# Patient Record
Sex: FEMALE | Race: WHITE | NOT HISPANIC OR LATINO | Employment: FULL TIME | URBAN - NONMETROPOLITAN AREA
[De-identification: names, ages, dates, MRNs, and addresses within clinical notes are randomized per-mention and may not be internally consistent; named-entity substitution may affect disease eponyms.]

---

## 2017-03-27 DIAGNOSIS — I10 ESSENTIAL HYPERTENSION, BENIGN: ICD-10-CM

## 2017-03-29 RX ORDER — LOSARTAN POTASSIUM 50 MG/1
50 TABLET ORAL DAILY
Qty: 90 TAB | Refills: 2 | Status: SHIPPED | OUTPATIENT
Start: 2017-03-29 | End: 2017-04-03 | Stop reason: SDUPTHER

## 2017-04-03 DIAGNOSIS — I10 ESSENTIAL HYPERTENSION, BENIGN: ICD-10-CM

## 2017-04-03 RX ORDER — LOSARTAN POTASSIUM 50 MG/1
50 TABLET ORAL DAILY
Qty: 90 TAB | Refills: 3 | Status: SHIPPED | OUTPATIENT
Start: 2017-04-03 | End: 2017-12-07 | Stop reason: SDUPTHER

## 2017-04-17 DIAGNOSIS — I71.40 AAA (ABDOMINAL AORTIC ANEURYSM) WITHOUT RUPTURE (HCC): ICD-10-CM

## 2017-04-17 DIAGNOSIS — E78.2 MIXED HYPERLIPIDEMIA: ICD-10-CM

## 2017-04-17 DIAGNOSIS — I25.10 CORONARY ARTERY DISEASE INVOLVING NATIVE CORONARY ARTERY OF NATIVE HEART WITHOUT ANGINA PECTORIS: ICD-10-CM

## 2017-05-02 ENCOUNTER — OFFICE VISIT (OUTPATIENT)
Dept: URGENT CARE | Facility: PHYSICIAN GROUP | Age: 67
End: 2017-05-02
Payer: COMMERCIAL

## 2017-05-02 VITALS
WEIGHT: 144 LBS | SYSTOLIC BLOOD PRESSURE: 120 MMHG | BODY MASS INDEX: 23.96 KG/M2 | TEMPERATURE: 98.8 F | HEART RATE: 92 BPM | RESPIRATION RATE: 18 BRPM | OXYGEN SATURATION: 96 % | DIASTOLIC BLOOD PRESSURE: 70 MMHG

## 2017-05-02 DIAGNOSIS — J40 BRONCHITIS: ICD-10-CM

## 2017-05-02 PROCEDURE — 94640 AIRWAY INHALATION TREATMENT: CPT | Performed by: PHYSICIAN ASSISTANT

## 2017-05-02 PROCEDURE — 99214 OFFICE O/P EST MOD 30 MIN: CPT | Mod: 25 | Performed by: PHYSICIAN ASSISTANT

## 2017-05-02 RX ORDER — AZITHROMYCIN 250 MG/1
TABLET, FILM COATED ORAL
Qty: 6 TAB | Refills: 0 | Status: SHIPPED | OUTPATIENT
Start: 2017-05-02 | End: 2017-07-11

## 2017-05-02 RX ORDER — ALBUTEROL SULFATE 90 UG/1
2 AEROSOL, METERED RESPIRATORY (INHALATION) EVERY 6 HOURS PRN
Qty: 8.5 G | Refills: 1 | Status: SHIPPED | OUTPATIENT
Start: 2017-05-02 | End: 2017-07-11

## 2017-05-02 RX ORDER — IPRATROPIUM BROMIDE AND ALBUTEROL SULFATE 2.5; .5 MG/3ML; MG/3ML
3 SOLUTION RESPIRATORY (INHALATION) ONCE
Status: COMPLETED | OUTPATIENT
Start: 2017-05-02 | End: 2017-05-02

## 2017-05-02 RX ORDER — CODEINE PHOSPHATE AND GUAIFENESIN 10; 100 MG/5ML; MG/5ML
5 SOLUTION ORAL EVERY 6 HOURS PRN
Qty: 100 ML | Refills: 0 | Status: SHIPPED | OUTPATIENT
Start: 2017-05-02 | End: 2017-07-11

## 2017-05-02 RX ORDER — BENZONATATE 200 MG/1
200 CAPSULE ORAL 3 TIMES DAILY PRN
Qty: 30 CAP | Refills: 0 | Status: SHIPPED | OUTPATIENT
Start: 2017-05-02 | End: 2017-07-11

## 2017-05-02 RX ADMIN — IPRATROPIUM BROMIDE AND ALBUTEROL SULFATE 3 ML: 2.5; .5 SOLUTION RESPIRATORY (INHALATION) at 10:56

## 2017-05-02 ASSESSMENT — ENCOUNTER SYMPTOMS
FEVER: 0
COUGH: 1
SPUTUM PRODUCTION: 1
CHILLS: 0
WHEEZING: 1
RHINORRHEA: 1
SINUS PAIN: 0
SHORTNESS OF BREATH: 1
MYALGIAS: 0

## 2017-05-02 NOTE — MR AVS SNAPSHOT
Jailyn Griffiths   2017 9:35 AM   Office Visit   MRN: [id number]    Department:  [facility]   Dept Phone:  [phone]    Description:  Female : 1950   Provider:  ERIN Ch           Reason for Visit     URI Cold Sx, x4 days      Allergies as of 2017     Allergen Noted Reactions    Nkda [No Known Drug Allergy] 2010         You were diagnosed with     Bronchitis   [128785]   Fluctuating for 4 days. Coarse breath sounds initially, improved with DuoNeb. Start albuterol, Tessalon, Robitussin-AC. Contingent azithromycin      Vital Signs     Blood Pressure Pulse Temperature Respirations Weight Oxygen Saturation    120/70 mmHg 92 37.1 °C (98.8 °F) 18 65.318 kg (144 lb) 96%    Smoking Status                   Former Smoker           Basic Information     Date Of Birth Sex Race Ethnicity Preferred Language    1950 Female White Non- English      Your appointments     2017  3:20 PM   FOLLOW UP with Shiv Black M.D.   [facility] ([facility]    [address] [place] [id number] [phone]            Aug 28, 2017  3:20 PM   ANNUAL EXAM PRE[facility] [address] [address] [phone]              Problem List              ICD-10-CM Priority Class Noted - Resolved    CAD (coronary artery disease), native coronary artery I25.10 Medium  2010 - Present    Varicose veins of leg with complications I83.899   2010 - Present    Sleep related leg cramps G47.62   2010 - Present    AAA (abdominal aortic aneurysm) without rupture (CMS-HCC) I71.4   2010 - Present    Nummular eczema L30.0   2011 - Present    Apparent CHF (congestive heart failure) provoked by celiac disease now resolved.  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   6/28/2012 - Present    Celiac disease K90.0   8/2/2012 - Present    PAD (peripheral artery disease)    6/12/2014 - Present    Psoriasis of scalp L40.9   6/12/2014 - Present    Mixed hyperlipidemia E78.2   12/11/2014 - Present    Abnormal LFTs R79.89   9/24/2015 - Present      Health Maintenance        Date Due Completion Dates    IMM DTaP/Tdap/Td Vaccine (1 - Tdap) 1/19/1971 ---    IMM ZOSTER VACCINE 1/19/2012 ---    BONE DENSITY 1/19/2017 ---    IMM PNEUMOCOCCAL 65+ (ADULT) LOW/MEDIUM RISK SERIES (1 of 2 - PCV13) 1/19/2017 ---    MAMMOGRAM 1/28/2017 1/28/2016, 12/1/2014    COLONOSCOPY 10/22/2023 10/22/2013            Current Immunizations     No immunizations on file.      Below and/or attached are the medications your provider expects you to take. Review all of your home medications and newly ordered medications with your provider and/or pharmacist. Follow medication instructions as directed by your provider and/or pharmacist. Please keep your medication list with you and share with your provider. Update the information when medications are discontinued, doses are changed, or new medications (including over-the-counter products) are added; and carry medication information at all times in the event of emergency situations     Allergies:  NKDA - (reactions not documented)               Medications  Valid as of: May 02, 2017 - 10:52 AM    Generic Name Brand Name Tablet Size Instructions for use    Albuterol Sulfate (Aero Soln) albuterol 108 (90 BASE) MCG/ACT Inhale 2 Puffs by mouth every 6 hours as needed for Shortness of Breath.        Aspirin (Tab)  MG Take 325 mg by mouth every day.        Atorvastatin Calcium (Tab) LIPITOR 40 MG Take 1 Tab by mouth every evening.        Azithromycin (Tab) ZITHROMAX 250 MG Take 2 tablets on day one, then take one tablet daily for 4 days        Benzonatate (Cap) TESSALON 200 MG Take 1 Cap by mouth 3 times a day as needed for Cough.         Clobetasol Propionate (Solution) TEMOVATE 0.05 % Apply to psoriasis to scalp twice per day x 2 weeks on then 2 weeks off        Ezetimibe (Tab) ZETIA 10 MG Take 1 Tab by mouth every day.        Guaifenesin-Codeine (Solution) ROBITUSSIN -10 mg/5mL Take 5 mL by mouth every 6 hours as needed for Cough.        Losartan Potassium (Tab) COZAAR 50 MG Take 1 Tab by mouth every day.        Ondansetron (TABLET DISPERSIBLE) ZOFRAN ODT 4 MG Take 4 mg by mouth every 8 hours as needed.        Vitamin E (Cap) VITAMIN E 400 UNIT Take 400 Units by mouth every day.        .                 Medicines prescribed today were sent to:     [address] [address]    Phone: [phone] Fax: [phone]    Open 24 Hours?: No    [facility] [address] [place] [address]    [address] [facility] [address]    Phone: [phone] Fax: [phone]    Open 24 Hours?: No      Medication refill instructions:       If your prescription bottle indicates you have medication refills left, it is not necessary to call your provider’s office. Please contact your pharmacy and they will refill your medication.    If your prescription bottle indicates you do not have any refills left, you may request refills at any time through one of the following ways: The online [employer] system (except Urgent Care), by calling your provider’s office, or by asking your pharmacy to contact your provider’s office with a refill request. Medication refills are processed only during regular business hours and may not be available until the next business day. Your provider may request additional information or to have a follow-up visit with you prior to refilling your medication.   *Please Note: Medication refills are assigned a new Rx number when refilled electronically. Your pharmacy may indicate that no refills were authorized even though a new prescription for  the same medication is available at the pharmacy. Please request the medicine by name with the pharmacy before contacting your provider for a refill.        Instructions    Bronchitis  Bronchitis is the body's way of reacting to injury and/or infection (inflammation) of the bronchi. Bronchi are the air tubes that extend from the windpipe into the lungs. If the inflammation becomes severe, it may cause shortness of breath.  CAUSES   Inflammation may be caused by:  · A virus.  · Germs (bacteria).  · Dust.  · Allergens.  · Pollutants and many other irritants.  The cells lining the bronchial tree are covered with tiny hairs (cilia). These constantly beat upward, away from the lungs, toward the mouth. This keeps the lungs free of pollutants. When these cells become too irritated and are unable to do their job, mucus begins to develop. This causes the characteristic cough of bronchitis. The cough clears the lungs when the cilia are unable to do their job. Without either of these protective mechanisms, the mucus would settle in the lungs. Then you would develop pneumonia.  Smoking is a common cause of bronchitis and can contribute to pneumonia. Stopping this habit is the single most important thing you can do to help yourself.  TREATMENT   · Your caregiver may prescribe an antibiotic if the cough is caused by bacteria. Also, medicines that open up your airways make it easier to breathe. Your caregiver may also recommend or prescribe an expectorant. It will loosen the mucus to be coughed up. Only take over-the-counter or prescription medicines for pain, discomfort, or fever as directed by your caregiver.  · Removing whatever causes the problem (smoking, for example) is critical to preventing the problem from getting worse.  · Cough suppressants may be prescribed for relief of cough symptoms.  · Inhaled medicines may be prescribed to help with symptoms now and to help prevent problems from returning.  · For those with  recurrent (chronic) bronchitis, there may be a need for steroid medicines.  SEEK IMMEDIATE MEDICAL CARE IF:   · During treatment, you develop more pus-like mucus (purulent sputum).  · You have a fever.  · Your baby is older than 3 months with a rectal temperature of 102° F (38.9° C) or higher.  · Your baby is 3 months old or younger with a rectal temperature of 100.4° F (38° C) or higher.  · You become progressively more ill.  · You have increased difficulty breathing, wheezing, or shortness of breath.  It is necessary to seek immediate medical care if you are elderly or sick from any other disease.  MAKE SURE YOU:   · Understand these instructions.  · Will watch your condition.  · Will get help right away if you are not doing well or get worse.  Document Released: 12/18/2006 Document Revised: 03/11/2013 Document Reviewed: 10/27/2009  ExitCare® Patient Information ©2014 [employer], [employer].         Other Notes About Your Plan     Mammo 2011: normal at [place]  Colonoscopy:  NEEDED, bur refuses  Dr. Black-cardiology           MyChart Status: Patient Declined

## 2017-05-02 NOTE — PROGRESS NOTES
Subjective:      Jailyn Griffiths is a 67 y.o. female who presents with URI            HPI Comments: Four-day history of rhinorrhea, congestion, cough, shortness of breath, wheezing, and yellow sputum. Symptoms fluctuating but not improving. No fevers or chills. Cough is keeping her up at night.    URI   This is a new problem. The current episode started in the past 7 days. The problem has been gradually worsening. There has been no fever. Associated symptoms include congestion, coughing, rhinorrhea and wheezing. Pertinent negatives include no chest pain or sinus pain. She has tried nothing for the symptoms. The treatment provided no relief.       Review of Systems   Constitutional: Negative for fever and chills.   HENT: Positive for congestion and rhinorrhea.    Respiratory: Positive for cough, sputum production, shortness of breath and wheezing.    Cardiovascular: Negative for chest pain.   Musculoskeletal: Negative for myalgias.       Allergies:Nkda    Current Outpatient Prescriptions [facility]   Medication Sig Dispense Refill   • albuterol 108 (90 BASE) MCG/ACT Aero Soln inhalation aerosol Inhale 2 Puffs by mouth every 6 hours as needed for Shortness of Breath. 8.5 g 1   • benzonatate (TESSALON) 200 MG capsule Take 1 Cap by mouth 3 times a day as needed for Cough. 30 Cap 0   • guaifenesin-codeine (ROBITUSSIN AC) Solution oral solution Take 5 mL by mouth every 6 hours as needed for Cough. 100 mL 0   • azithromycin (ZITHROMAX) 250 MG Tab Take 2 tablets on day one, then take one tablet daily for 4 days 6 Tab 0   • losartan (COZAAR) 50 MG Tab Take 1 Tab by mouth every day. 90 Tab 3   • ezetimibe (ZETIA) 10 MG Tab Take 1 Tab by mouth every day. 90 Tab 3   • atorvastatin (LIPITOR) 40 MG Tab Take 1 Tab by mouth every evening. 90 Tab 3   • clobetasol (TEMOVATE) 0.05 % external solution Apply to psoriasis to scalp twice per day x 2 weeks on then 2 weeks off 1 Bottle 3   • vitamin e (VITAMIN E) 400 UNIT CAPS Take  400 Units by mouth every day.     • ondansetron (ZOFRAN ODT) 4 MG TBDP Take 4 mg by mouth every 8 hours as needed.     • aspirin (ASA) 325 MG TABS Take 325 mg by mouth every day.       Current Facility-Administered Medications Ordered in Epic   Medication Dose Route Frequency Provider Last Rate Last Dose   • ipratropium-albuterol (DUONEB) nebulizer solution 3 mL  3 mL Nebulization Once ERIN Ch           Past Medical History   Diagnosis Date   • CAD (coronary artery disease), native coronary artery 2010   • Pure hypercholesterolemia 2010   • Postsurgical menopause      at 29 yo due to bleeding   • Varicose veins of leg with complications 2010   • Sleep related leg cramps 2010   • AAA (abdominal aortic aneurysm) without rupture (CMS-HCC) 2010   • dentures    • Pain      legs   • Nummular eczema 2011   • Protein losing enteropathy 2012   • Weight loss 2012   • Stented right coronary artery 2012     3x18mm Duet,         Social History   Substance Use Topics   • Smoking status: Former Smoker -- 1.50 packs/day for 30 years     Types: Cigarettes   • Smokeless tobacco: Never Used      Comment: 1 ppd 20 yrs,quit    • Alcohol Use: 3.5 oz/week     7 Glasses of wine per week      Comment: 2 per day       Family Status   Relation Status Death Age   • Mother Alive      79 in    • Father  75     Diabetes      Family History   Problem Relation Age of Onset   • Cancer Mother      breast cancer at age 75   • Diabetes Mother    • Diabetes Father    • Hypertension Father    • Hyperlipidemia Father    • Stroke Father    • Cancer Maternal Grandfather      colon cancer   • Heart Disease Paternal Grandmother         Objective:     /70 mmHg  Pulse 92  Temp(Src) 37.1 °C (98.8 °F)  Resp 18  Wt 65.318 kg (144 lb)  SpO2 96%     Physical Exam   Constitutional: She is oriented to person, place, and time. She appears well-developed and well-nourished. No  distress.   Appears mildly uncomfortable   HENT:   Head: Normocephalic and atraumatic.   Right Ear: External ear normal.   Left Ear: External ear normal.   Mouth/Throat: Oropharynx is clear and moist.   Eyes: Right eye exhibits no discharge. Left eye exhibits no discharge.   Neck: Normal range of motion. Neck supple.   Cardiovascular: Normal rate and regular rhythm.    Pulmonary/Chest: Effort normal. She has wheezes. She has no rales.   Frequent nonproductive cough. Coarse breath sounds with diffuse wheezes initially, improved but not resolved with DuoNeb   Neurological: She is alert and oriented to person, place, and time.   Skin: Skin is warm and dry. She is not diaphoretic.   Psychiatric: She has a normal mood and affect. Her behavior is normal. Judgment and thought content normal.   Nursing note and vitals reviewed.              Assessment/Plan:     1. Bronchitis  ipratropium-albuterol (DUONEB) nebulizer solution 3 mL    albuterol 108 (90 BASE) MCG/ACT Aero Soln inhalation aerosol    benzonatate (TESSALON) 200 MG capsule    guaifenesin-codeine (ROBITUSSIN AC) Solution oral solution    azithromycin (ZITHROMAX) 250 MG Tab    Fluctuating for 4 days. Coarse breath sounds initially, improved with DuoNeb. Start albuterol, Tessalon, Robitussin-AC. Contingent azithromycin       Elsevier Interactive Patient Education given: Bronchitis    Please note that this dictation was created using voice recognition software. I have made every reasonable attempt to correct obvious errors, but I expect that there are errors of grammar and possibly content that I did not discover before finalizing the note.

## 2017-05-02 NOTE — PATIENT INSTRUCTIONS

## 2017-05-11 ENCOUNTER — OFFICE VISIT (OUTPATIENT)
Dept: URGENT CARE | Facility: PHYSICIAN GROUP | Age: 67
End: 2017-05-11
Payer: COMMERCIAL

## 2017-05-11 VITALS
BODY MASS INDEX: 23.99 KG/M2 | WEIGHT: 144 LBS | DIASTOLIC BLOOD PRESSURE: 60 MMHG | HEART RATE: 103 BPM | TEMPERATURE: 98.6 F | SYSTOLIC BLOOD PRESSURE: 138 MMHG | HEIGHT: 65 IN | OXYGEN SATURATION: 97 % | RESPIRATION RATE: 18 BRPM

## 2017-05-11 DIAGNOSIS — R60.0 BILATERAL LOWER EXTREMITY EDEMA: ICD-10-CM

## 2017-05-11 PROCEDURE — 99213 OFFICE O/P EST LOW 20 MIN: CPT | Performed by: PHYSICIAN ASSISTANT

## 2017-05-11 RX ORDER — LEVOFLOXACIN 500 MG/1
500 TABLET, FILM COATED ORAL DAILY
COMMUNITY
End: 2017-07-11

## 2017-05-11 RX ORDER — PREDNISONE 10 MG/1
10 TABLET ORAL DAILY
COMMUNITY
End: 2017-07-11

## 2017-05-11 ASSESSMENT — ENCOUNTER SYMPTOMS
CHILLS: 0
COUGH: 1
FEVER: 0

## 2017-05-11 NOTE — PATIENT INSTRUCTIONS
Peripheral Edema  You have swelling in your legs (peripheral edema). This swelling is due to excess accumulation of salt and water in your body. Edema may be a sign of heart, kidney or liver disease, or a side effect of a medication. It may also be due to problems in the leg veins. Elevating your legs and using special support stockings may be very helpful, if the cause of the swelling is due to poor venous circulation. Avoid long periods of standing, whatever the cause.  Treatment of edema depends on identifying the cause. Chips, pretzels, pickles and other salty foods should be avoided. Restricting salt in your diet is almost always needed. Water pills (diuretics) are often used to remove the excess salt and water from your body via urine. These medicines prevent the kidney from reabsorbing sodium. This increases urine flow.  Diuretic treatment may also result in lowering of potassium levels in your body. Potassium supplements may be needed if you have to use diuretics daily. Daily weights can help you keep track of your progress in clearing your edema. You should call your caregiver for follow up care as recommended.  SEEK IMMEDIATE MEDICAL CARE IF:   · You have increased swelling, pain, redness, or heat in your legs.  · You develop shortness of breath, especially when lying down.  · You develop chest or abdominal pain, weakness, or fainting.  · You have a fever.     This information is not intended to replace advice given to you by your health care provider. Make sure you discuss any questions you have with your health care provider.     Document Released: 01/25/2006 Document Revised: 03/11/2013 Document Reviewed: 01/05/2011  [employer] Interactive Patient Education ©2016 [employer]

## 2017-05-11 NOTE — MR AVS SNAPSHOT
"        Jailyn Griffiths   2017 2:20 PM   Office Visit   MRN: [id number]    Department:  [facility]   Dept Phone:  [phone]    Description:  Female : 1950   Provider:  ERIN Ch           Reason for Visit     Foot Swelling Seen in ER Tuesday, both legs/feet swelling      Allergies as of 2017     Allergen Noted Reactions    Nkda [No Known Drug Allergy] 2010         You were diagnosed with     Bilateral lower extremity edema   [336877]         Vital Signs     Blood Pressure Pulse Temperature Respirations Height Weight    138/60 mmHg 103 37 °C (98.6 °F) 18 1.651 m (5' 5\") 65.318 kg (144 lb)    Body Mass Index Oxygen Saturation Breastfeeding? Smoking Status          23.96 kg/m2 97% No Former Smoker        Basic Information     Date Of Birth Sex Race Ethnicity Preferred Language    1950 Female White Non- English      Your appointments     2017  3:20 PM   FOLLOW UP with Shiv Black M.D.   [facility] (--)    [address] [place] [id number] [phone]            Aug 28, 2017  3:20 PM   ANNUAL EXAM PREVENTATIVE with uLis Woods M.D.   [facility] [address] [address] [phone]              Problem List              ICD-10-CM Priority Class Noted - Resolved    CAD (coronary artery disease), native coronary artery I25.10 Medium  2010 - Present    Varicose veins of leg with complications I83.899   2010 - Present    Sleep related leg cramps G47.62   2010 - Present    AAA (abdominal aortic aneurysm) without rupture (CMS-HCC) I71.4   2010 - Present    Nummular eczema L30.0   2011 - Present    Apparent CHF (congestive heart failure) provoked by celiac disease now resolved.  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   " 6/28/2012 - Present    Celiac disease K90.0   8/2/2012 - Present    PAD (peripheral artery disease)    6/12/2014 - Present    Psoriasis of scalp L40.9   6/12/2014 - Present    Mixed hyperlipidemia E78.2   12/11/2014 - Present    Abnormal LFTs R79.89   9/24/2015 - Present      Health Maintenance        Date Due Completion Dates    IMM DTaP/Tdap/Td Vaccine (1 - Tdap) 1/19/1971 ---    IMM ZOSTER VACCINE 1/19/2012 ---    BONE DENSITY 1/19/2017 ---    IMM PNEUMOCOCCAL 65+ (ADULT) LOW/MEDIUM RISK SERIES (1 of 2 - PCV13) 1/19/2017 ---    MAMMOGRAM 1/28/2017 1/28/2016, 12/1/2014    COLONOSCOPY 10/22/2023 10/22/2013            Current Immunizations     No immunizations on file.      Below and/or attached are the medications your provider expects you to take. Review all of your home medications and newly ordered medications with your provider and/or pharmacist. Follow medication instructions as directed by your provider and/or pharmacist. Please keep your medication list with you and share with your provider. Update the information when medications are discontinued, doses are changed, or new medications (including over-the-counter products) are added; and carry medication information at all times in the event of emergency situations     Allergies:  NKDA - (reactions not documented)               Medications  Valid as of: May 11, 2017 -  2:39 PM    Generic Name Brand Name Tablet Size Instructions for use    Albuterol Sulfate (Aero Soln) albuterol 108 (90 BASE) MCG/ACT Inhale 2 Puffs by mouth every 6 hours as needed for Shortness of Breath.        Aspirin (Tab)  MG Take 325 mg by mouth every day.        Atorvastatin Calcium (Tab) LIPITOR 40 MG Take 1 Tab by mouth every evening.        Azithromycin (Tab) ZITHROMAX 250 MG Take 2 tablets on day one, then take one tablet daily for 4 days        Benzonatate (Cap) TESSALON 200 MG Take 1 Cap by mouth 3 times a day as needed for Cough.        Clobetasol Propionate (Solution)  TEMOVATE 0.05 % Apply to psoriasis to scalp twice per day x 2 weeks on then 2 weeks off        Ezetimibe (Tab) ZETIA 10 MG Take 1 Tab by mouth every day.        Guaifenesin-Codeine (Solution) ROBITUSSIN -10 mg/5mL Take 5 mL by mouth every 6 hours as needed for Cough.        LevoFLOXacin (Tab) LEVAQUIN 500 MG Take 500 mg by mouth every day.        Losartan Potassium (Tab) COZAAR 50 MG Take 1 Tab by mouth every day.        Ondansetron (TABLET DISPERSIBLE) ZOFRAN ODT 4 MG Take 4 mg by mouth every 8 hours as needed.        PredniSONE (Tab) DELTASONE 10 MG Take 10 mg by mouth every day.        Probiotic Product   Take  by mouth.        Vitamin E (Cap) VITAMIN E 400 UNIT Take 400 Units by mouth every day.        .                 Medicines prescribed today were sent to:     [facility] [address] [place] [id number]    Phone: [phone] Fax: [phone]    Open 24 Hours?: No    [facility] [address] [place] [address]    [address] [facility] [address]    Phone: [phone] Fax: [phone]    Open 24 Hours?: No      Medication refill instructions:       If your prescription bottle indicates you have medication refills left, it is not necessary to call your provider’s office. Please contact your pharmacy and they will refill your medication.    If your prescription bottle indicates you do not have any refills left, you may request refills at any time through one of the following ways: The online [employer] system (except Urgent Care), by calling your provider’s office, or by asking your pharmacy to contact your provider’s office with a refill request. Medication refills are processed only during regular business hours and may not be available until the next business day. Your provider may request additional information or to have a follow-up visit with you prior to refilling your medication.   *Please Note: Medication refills  are assigned a new Rx number when refilled electronically. Your pharmacy may indicate that no refills were authorized even though a new prescription for the same medication is available at the pharmacy. Please request the medicine by name with the pharmacy before contacting your provider for a refill.        Instructions    Peripheral Edema  You have swelling in your legs (peripheral edema). This swelling is due to excess accumulation of salt and water in your body. Edema may be a sign of heart, kidney or liver disease, or a side effect of a medication. It may also be due to problems in the leg veins. Elevating your legs and using special support stockings may be very helpful, if the cause of the swelling is due to poor venous circulation. Avoid long periods of standing, whatever the cause.  Treatment of edema depends on identifying the cause. Chips, pretzels, pickles and other salty foods should be avoided. Restricting salt in your diet is almost always needed. Water pills (diuretics) are often used to remove the excess salt and water from your body via urine. These medicines prevent the kidney from reabsorbing sodium. This increases urine flow.  Diuretic treatment may also result in lowering of potassium levels in your body. Potassium supplements may be needed if you have to use diuretics daily. Daily weights can help you keep track of your progress in clearing your edema. You should call your caregiver for follow up care as recommended.  SEEK IMMEDIATE MEDICAL CARE IF:   · You have increased swelling, pain, redness, or heat in your legs.  · You develop shortness of breath, especially when lying down.  · You develop chest or abdominal pain, weakness, or fainting.  · You have a fever.     This information is not intended to replace advice given to you by your health care provider. Make sure you discuss any questions you have with your health care provider.     Document Released: 01/25/2006 Document Revised:  03/11/2013 Document Reviewed: 01/05/2011  [employer] Interactive Patient Education ©2016 [employer]         Other Notes About Your Plan     Mammo 2011: normal at [place]  Colonoscopy:  NEEDED, bur refuses  Dr. Black-cardiology           MyChart Status: Patient Declined

## 2017-05-11 NOTE — PROGRESS NOTES
Subjective:      Jailyn Griffiths is a 67 y.o. female who presents with Foot Swelling            HPI Comments: Patient was recently hospitalized for pneumonia. During her hospital stay she received IV fluids, steroids, and antibiotics. She reports that her breathing has improved and she is feeling better for the most part. Unfortunately she has noticed that her legs are swollen. Denies any pain. No new shortness of breath or other complaints.    Foot Swelling  This is a new problem. The current episode started in the past 7 days. The problem occurs constantly. The problem has been waxing and waning. Associated symptoms include coughing. Pertinent negatives include no chest pain, chills or fever. Nothing aggravates the symptoms. Treatments tried: elevation. The treatment provided mild relief.       Review of Systems   Constitutional: Negative for fever and chills.   Respiratory: Positive for cough.    Cardiovascular: Positive for leg swelling. Negative for chest pain.       Allergies:Nkda    Current Outpatient Prescriptions Ordered in [facility]   Medication Sig Dispense Refill   • Probiotic Product (PROBIOTIC DAILY PO) Take  by mouth.     • predniSONE (DELTASONE) 10 MG Tab Take 10 mg by mouth every day.     • levofloxacin (LEVAQUIN) 500 MG tablet Take 500 mg by mouth every day.     • albuterol 108 (90 BASE) MCG/ACT Aero Soln inhalation aerosol Inhale 2 Puffs by mouth every 6 hours as needed for Shortness of Breath. 8.5 g 1   • benzonatate (TESSALON) 200 MG capsule Take 1 Cap by mouth 3 times a day as needed for Cough. 30 Cap 0   • guaifenesin-codeine (ROBITUSSIN AC) Solution oral solution Take 5 mL by mouth every 6 hours as needed for Cough. 100 mL 0   • azithromycin (ZITHROMAX) 250 MG Tab Take 2 tablets on day one, then take one tablet daily for 4 days 6 Tab 0   • losartan (COZAAR) 50 MG Tab Take 1 Tab by mouth every day. 90 Tab 3   • ezetimibe (ZETIA) 10 MG Tab Take 1 Tab by mouth every day. 90 Tab 3   • atorvastatin  "(LIPITOR) 40 MG Tab Take 1 Tab by mouth every evening. 90 Tab 3   • clobetasol (TEMOVATE) 0.05 % external solution Apply to psoriasis to scalp twice per day x 2 weeks on then 2 weeks off 1 Bottle 3   • vitamin e (VITAMIN E) 400 UNIT CAPS Take 400 Units by mouth every day.     • ondansetron (ZOFRAN ODT) 4 MG TBDP Take 4 mg by mouth every 8 hours as needed.     • aspirin (ASA) 325 MG TABS Take 325 mg by mouth every day.       No current [facility]-ordered facility-administered medications on file.       Past Medical History   Diagnosis Date   • CAD (coronary artery disease), native coronary artery 2010   • Pure hypercholesterolemia 2010   • Postsurgical menopause      at 27 yo due to bleeding   • Varicose veins of leg with complications 2010   • Sleep related leg cramps 2010   • AAA (abdominal aortic aneurysm) without rupture (CMS-[facility]) 2010   • dentures    • Pain      legs   • Nummular eczema 2011   • Protein losing enteropathy 2012   • Weight loss 2012   • Stented right coronary artery 2012     3x18mm Duet,         Social History   Substance Use Topics   • Smoking status: Former Smoker -- 1.50 packs/day for 30 years     Types: Cigarettes   • Smokeless tobacco: Never Used      Comment: 1 ppd 20 yrs,quit    • Alcohol Use: 3.5 oz/week     7 Glasses of wine per week      Comment: 2 per day       Family Status   Relation Status Death Age   • Mother Alive      79 in    • Father  75     Diabetes      Family History   Problem Relation Age of Onset   • Cancer Mother      breast cancer at age 75   • Diabetes Mother    • Diabetes Father    • Hypertension Father    • Hyperlipidemia Father    • Stroke Father    • Cancer Maternal Grandfather      colon cancer   • Heart Disease Paternal Grandmother         Objective:     /60 mmHg  Pulse 103  Temp(Src) 37 °C (98.6 °F)  Resp 18  Ht 1.651 m (5' 5\")  Wt 65.318 kg (144 lb)  BMI 23.96 kg/m2  SpO2 97%  Breastfeeding? " No     Physical Exam   Constitutional: She is oriented to person, place, and time. She appears well-developed and well-nourished. No distress.   HENT:   Head: Normocephalic and atraumatic.   Neck: Normal range of motion. Neck supple.   Cardiovascular: Regular rhythm.    Tachycardic   Pulmonary/Chest: Effort normal and breath sounds normal. She has no wheezes. She has no rales.   Occasional nonproductive cough   Neurological: She is alert and oriented to person, place, and time.   Skin: Skin is warm and dry. She is not diaphoretic.   Psychiatric: She has a normal mood and affect. Her behavior is normal. Judgment and thought content normal.   Nursing note and vitals reviewed.              Assessment/Plan:     1. Bilateral lower extremity edema      Ongoing following recent hospitalization. Lungs clear. Likely secondary to steroids and fluids. Recommended compression stockings. Follow-up PCP as planned. To ER if worsening.        [employer] Interactive Patient Education given: Peripheral edema    Please note that this dictation was created using voice recognition software. I have made every reasonable attempt to correct obvious errors, but I expect that there are errors of grammar and possibly content that I did not discover before finalizing the note.

## 2017-07-11 ENCOUNTER — OFFICE VISIT (OUTPATIENT)
Dept: CARDIOLOGY | Facility: PHYSICIAN GROUP | Age: 67
End: 2017-07-11
Payer: COMMERCIAL

## 2017-07-11 VITALS
HEART RATE: 94 BPM | OXYGEN SATURATION: 98 % | BODY MASS INDEX: 23.32 KG/M2 | HEIGHT: 65 IN | WEIGHT: 140 LBS | DIASTOLIC BLOOD PRESSURE: 76 MMHG | SYSTOLIC BLOOD PRESSURE: 130 MMHG

## 2017-07-11 DIAGNOSIS — I25.10 CORONARY ARTERY DISEASE INVOLVING NATIVE CORONARY ARTERY OF NATIVE HEART WITHOUT ANGINA PECTORIS: ICD-10-CM

## 2017-07-11 DIAGNOSIS — R79.89 ABNORMAL LFTS: ICD-10-CM

## 2017-07-11 DIAGNOSIS — E78.2 MIXED HYPERLIPIDEMIA: ICD-10-CM

## 2017-07-11 PROCEDURE — 99213 OFFICE O/P EST LOW 20 MIN: CPT | Performed by: INTERNAL MEDICINE

## 2017-07-11 ASSESSMENT — ENCOUNTER SYMPTOMS
ORTHOPNEA: 0
WHEEZING: 0
COUGH: 0
FALLS: 0
ABDOMINAL PAIN: 0
PND: 0
BRUISES/BLEEDS EASILY: 0
MYALGIAS: 0

## 2017-07-11 NOTE — PROGRESS NOTES
Subjective:   Jailyn Griffiths is a 67 y.o. female who presents today for follow-up of her lipids primarily but also her known coronary disease. She saw Dr. Martínez regarding the aorta and is now scheduled for regular follow-up.     Past Medical History   Diagnosis Date   • CAD (coronary artery disease), native coronary artery 1999     3x18mm Duet stent RCA   • Dyslipidemia      Elevated HDL and LDL   • Postsurgical menopause      at 29 yo due to bleeding   • Varicose veins of leg with complications    • Sleep related leg cramps    • AAA (abdominal aortic aneurysm) without rupture (CMS-[facility])    • Dental disorder      dentures [K08.9]   • Nummular eczema    • Celiac disease 2012     With protein losing enteropathy and transient apparent congestive failure provoked by that with normal echocardiogram. Evaluated by gastrointestinal consultants, Dr. Lira   • Abnormal LFTs 9/24/2015     Past Surgical History   Procedure Laterality Date   • Stent placement  1996     RCA done emergently   • Dorcas by laparoscopy  1996   • Hysterectomy, total abdominal       with BSO did HRT for 25 yrs now off   • Breast biopsy       left, normal   • Appendectomy     • Tonsillectomy and adenoidectomy     • Gyn surgery  over 30 years ago     hysterectomy   • Vein ligation radio frequency  7/19/2010     Performed by CHARLENE ROSE at SURGERY [facility]     Family History   Problem Relation Age of Onset   • Cancer Mother      breast cancer at age 75   • Diabetes Mother    • Diabetes Father    • Hypertension Father    • Hyperlipidemia Father    • Stroke Father    • Cancer Maternal Grandfather      colon cancer   • Heart Disease Paternal Grandmother      History   Smoking status   • Former Smoker -- 1.50 packs/day for 30 years   • Types: Cigarettes   Smokeless tobacco   • Never Used     Comment: 1 ppd 20 yrs,quit 1999     Allergies   Allergen Reactions   • Nkda [No Known Drug Allergy]      Outpatient Encounter Prescriptions as of 7/11/2017  "  Medication Sig Dispense Refill   • Probiotic Product (PROBIOTIC DAILY PO) Take  by mouth.     • losartan (COZAAR) 50 MG Tab Take 1 Tab by mouth every day. 90 Tab 3   • ezetimibe (ZETIA) 10 MG Tab Take 1 Tab by mouth every day. 90 Tab 3   • atorvastatin (LIPITOR) 40 MG Tab Take 1 Tab by mouth every evening. 90 Tab 3   • clobetasol (TEMOVATE) 0.05 % external solution Apply to psoriasis to scalp twice per day x 2 weeks on then 2 weeks off 1 Bottle 3   • vitamin e (VITAMIN E) 400 UNIT CAPS Take 400 Units by mouth every day.     • aspirin (ASA) 325 MG TABS Take 325 mg by mouth every day.     • [DISCONTINUED] predniSONE (DELTASONE) 10 MG Tab Take 10 mg by mouth every day.     • [DISCONTINUED] levofloxacin (LEVAQUIN) 500 MG tablet Take 500 mg by mouth every day.     • [DISCONTINUED] albuterol 108 (90 BASE) MCG/ACT Aero Soln inhalation aerosol Inhale 2 Puffs by mouth every 6 hours as needed for Shortness of Breath. 8.5 g 1   • [DISCONTINUED] benzonatate (TESSALON) 200 MG capsule Take 1 Cap by mouth 3 times a day as needed for Cough. 30 Cap 0   • [DISCONTINUED] guaifenesin-codeine (ROBITUSSIN AC) Solution oral solution Take 5 mL by mouth every 6 hours as needed for Cough. 100 mL 0   • [DISCONTINUED] azithromycin (ZITHROMAX) 250 MG Tab Take 2 tablets on day one, then take one tablet daily for 4 days 6 Tab 0   • [DISCONTINUED] ondansetron (ZOFRAN ODT) 4 MG TBDP Take 4 mg by mouth every 8 hours as needed.       No facility-administered encounter medications on file as of 7/11/2017.     Review of Systems   HENT: Negative for nosebleeds.    Respiratory: Negative for cough and wheezing.         She has recovered completely from the pneumonia that she had in May   Cardiovascular: Negative for orthopnea and PND.   Gastrointestinal: Negative for abdominal pain.   Musculoskeletal: Negative for myalgias and falls.   Endo/Heme/Allergies: Does not bruise/bleed easily.        Objective:   /76 mmHg  Pulse 94  Ht 1.651 m (5' 5\")  " Wt 63.504 kg (140 lb)  BMI 23.30 kg/m2  SpO2 98%    Physical Exam   Constitutional: She appears well-developed and well-nourished.   Eyes: Conjunctivae are normal. No scleral icterus.   Neck: No JVD present.   Cardiovascular: Normal rate, regular rhythm, normal heart sounds and intact distal pulses.  Exam reveals no gallop.    No murmur heard.  Pulmonary/Chest: Effort normal and breath sounds normal.   Musculoskeletal: She exhibits no edema.   Skin: Skin is warm and dry.   Psychiatric: She has a normal mood and affect. Thought content normal.       Assessment:     1. Coronary artery disease involving native coronary artery of native heart without angina pectoris     2. Mixed hyperlipidemia     3. Abnormal LFTs       Liver functions are perplexing. She abruptly worsened during her pneumonia and then has improved again after that but still not back to her normal baseline of last October. They were transiently abnormal last year as well. She and I are both certain she had a hepatitis panel but we cannot find the results and I have requested another. Coronary disease is asymptomatic. Her hyperlipidemia is predominantly hypercholesterolemia with an elevated HDL and this combination of Zetia and atorvastatin may be at least in part responsible for the abnormal liver function studies. She requires cholesterol intervention because of her coronary disease and aortic disease even though she has the extraordinarily favorable HDL.  Medical Decision Making:  Today's Assessment / Status / Plan:     Follow-up hepatitis panel and follow-up liver function studies and lipids in August and call for those results.  Further evaluation based on that. I made no change in her regimen today otherwise. She may require another gastroenterology consultation with Dr. Lira's team. Use of emergency medical system reviewed for any symptoms.

## 2017-07-11 NOTE — Clinical Note
[facility] [address]  Phone: [phone]  Fax: [phone]              Jailyn Griffiths  1950    Encounter Date: 7/11/2017    Shiv Black M.D.          PROGRESS NOTE:  Subjective:   Jailyn Griffiths is a 67 y.o. female who presents today for follow-up of her lipids primarily but also her known coronary disease. She saw Dr. Martínez regarding the aorta and is now scheduled for regular follow-up.     Past Medical History   Diagnosis Date   • CAD (coronary artery disease), native coronary artery 1999     3x18mm Duet stent RCA   • Dyslipidemia      Elevated HDL and LDL   • Postsurgical menopause      at 27 yo due to bleeding   • Varicose veins of leg with complications    • Sleep related leg cramps    • AAA (abdominal aortic aneurysm) without rupture (CMS-[facility])    • Dental disorder      dentures [K08.9]   • Nummular eczema    • Celiac disease 2012     With protein losing enteropathy and transient apparent congestive failure provoked by that with normal echocardiogram. Evaluated by gastrointestinal consultants, Dr. Lira   • Abnormal LFTs 9/24/2015     Past Surgical History   Procedure Laterality Date   • Stent placement  1996     RCA done emergently   • Dorcas by laparoscopy  1996   • Hysterectomy, total abdominal       with BSO did HRT for 25 yrs now off   • Breast biopsy       left, normal   • Appendectomy     • Tonsillectomy and adenoidectomy     • Gyn surgery  over 30 years ago     hysterectomy   • Vein ligation radio frequency  7/19/2010     Performed by CHARLENE ROSE at SURGERY [facility]     Family History   Problem Relation Age of Onset   • Cancer Mother      breast cancer at age 75   • Diabetes Mother    • Diabetes Father    • Hypertension Father    • Hyperlipidemia Father    • Stroke Father    • Cancer Maternal Grandfather      colon cancer   • Heart Disease Paternal Grandmother      History   Smoking  status   • Former Smoker -- 1.50 packs/day for 30 years   • Types: Cigarettes   Smokeless tobacco   • Never Used     Comment: 1 ppd 20 yrs,quit 1999     Allergies   Allergen Reactions   • Nkda [No Known Drug Allergy]      Outpatient Encounter Prescriptions as of 7/11/2017   Medication Sig Dispense Refill   • Probiotic Product (PROBIOTIC DAILY PO) Take  by mouth.     • losartan (COZAAR) 50 MG Tab Take 1 Tab by mouth every day. 90 Tab 3   • ezetimibe (ZETIA) 10 MG Tab Take 1 Tab by mouth every day. 90 Tab 3   • atorvastatin (LIPITOR) 40 MG Tab Take 1 Tab by mouth every evening. 90 Tab 3   • clobetasol (TEMOVATE) 0.05 % external solution Apply to psoriasis to scalp twice per day x 2 weeks on then 2 weeks off 1 Bottle 3   • vitamin e (VITAMIN E) 400 UNIT CAPS Take 400 Units by mouth every day.     • aspirin (ASA) 325 MG TABS Take 325 mg by mouth every day.     • [DISCONTINUED] predniSONE (DELTASONE) 10 MG Tab Take 10 mg by mouth every day.     • [DISCONTINUED] levofloxacin (LEVAQUIN) 500 MG tablet Take 500 mg by mouth every day.     • [DISCONTINUED] albuterol 108 (90 BASE) MCG/ACT Aero Soln inhalation aerosol Inhale 2 Puffs by mouth every 6 hours as needed for Shortness of Breath. 8.5 g 1   • [DISCONTINUED] benzonatate (TESSALON) 200 MG capsule Take 1 Cap by mouth 3 times a day as needed for Cough. 30 Cap 0   • [DISCONTINUED] guaifenesin-codeine (ROBITUSSIN AC) Solution oral solution Take 5 mL by mouth every 6 hours as needed for Cough. 100 mL 0   • [DISCONTINUED] azithromycin (ZITHROMAX) 250 MG Tab Take 2 tablets on day one, then take one tablet daily for 4 days 6 Tab 0   • [DISCONTINUED] ondansetron (ZOFRAN ODT) 4 MG TBDP Take 4 mg by mouth every 8 hours as needed.       No facility-administered encounter medications on file as of 7/11/2017.     Review of Systems   HENT: Negative for nosebleeds.    Respiratory: Negative for cough and wheezing.         She has recovered completely from the pneumonia that she had in May  "  Cardiovascular: Negative for orthopnea and PND.   Gastrointestinal: Negative for abdominal pain.   Musculoskeletal: Negative for myalgias and falls.   Endo/Heme/Allergies: Does not bruise/bleed easily.        Objective:   /76 mmHg  Pulse 94  Ht 1.651 m (5' 5\")  Wt 63.504 kg (140 lb)  BMI 23.30 kg/m2  SpO2 98%    Physical Exam   Constitutional: She appears well-developed and well-nourished.   Eyes: Conjunctivae are normal. No scleral icterus.   Neck: No JVD present.   Cardiovascular: Normal rate, regular rhythm, normal heart sounds and intact distal pulses.  Exam reveals no gallop.    No murmur heard.  Pulmonary/Chest: Effort normal and breath sounds normal.   Musculoskeletal: She exhibits no edema.   Skin: Skin is warm and dry.   Psychiatric: She has a normal mood and affect. Thought content normal.       Assessment:     1. Coronary artery disease involving native coronary artery of native heart without angina pectoris     2. Mixed hyperlipidemia     3. Abnormal LFTs       Liver functions are perplexing. She abruptly worsened during her pneumonia and then has improved again after that but still not back to her normal baseline of last October. They were transiently abnormal last year as well. She and I are both certain she had a hepatitis panel but we cannot find the results and I have requested another. Coronary disease is asymptomatic. Her hyperlipidemia is predominantly hypercholesterolemia with an elevated HDL and this combination of Zetia and atorvastatin may be at least in part responsible for the abnormal liver function studies. She requires cholesterol intervention because of her coronary disease and aortic disease even though she has the extraordinarily favorable HDL.  Medical Decision Making:  Today's Assessment / Status / Plan:     Follow-up hepatitis panel and follow-up liver function studies and lipids in August and call for those results.  Further evaluation based on that. I made no change " in her regimen today otherwise. She may require another gastroenterology consultation with Dr. Lira's team. Use of emergency medical system reviewed for any symptoms.      No Recipients

## 2017-07-13 DIAGNOSIS — I25.10 CORONARY ARTERY DISEASE INVOLVING NATIVE CORONARY ARTERY OF NATIVE HEART WITHOUT ANGINA PECTORIS: ICD-10-CM

## 2017-07-13 DIAGNOSIS — E78.2 MIXED HYPERLIPIDEMIA: ICD-10-CM

## 2017-07-13 DIAGNOSIS — R79.89 ABNORMAL LFTS: ICD-10-CM

## 2017-08-18 DIAGNOSIS — E78.2 MIXED HYPERLIPIDEMIA: ICD-10-CM

## 2017-08-28 ENCOUNTER — OFFICE VISIT (OUTPATIENT)
Dept: MEDICAL GROUP | Facility: PHYSICIAN GROUP | Age: 67
End: 2017-08-28
Payer: COMMERCIAL

## 2017-08-28 VITALS
TEMPERATURE: 97.6 F | BODY MASS INDEX: 27.8 KG/M2 | RESPIRATION RATE: 16 BRPM | OXYGEN SATURATION: 97 % | DIASTOLIC BLOOD PRESSURE: 76 MMHG | HEART RATE: 88 BPM | SYSTOLIC BLOOD PRESSURE: 128 MMHG | WEIGHT: 141.6 LBS | HEIGHT: 60 IN

## 2017-08-28 DIAGNOSIS — R79.89 ABNORMAL LFTS: ICD-10-CM

## 2017-08-28 DIAGNOSIS — L30.0 NUMMULAR ECZEMA: ICD-10-CM

## 2017-08-28 DIAGNOSIS — L40.9 PSORIASIS OF SCALP: ICD-10-CM

## 2017-08-28 DIAGNOSIS — I25.10 CORONARY ARTERY DISEASE INVOLVING NATIVE CORONARY ARTERY OF NATIVE HEART WITHOUT ANGINA PECTORIS: ICD-10-CM

## 2017-08-28 DIAGNOSIS — K90.0 CELIAC DISEASE: ICD-10-CM

## 2017-08-28 DIAGNOSIS — I71.40 AAA (ABDOMINAL AORTIC ANEURYSM) WITHOUT RUPTURE (HCC): ICD-10-CM

## 2017-08-28 DIAGNOSIS — M54.31 SCIATICA OF RIGHT SIDE: ICD-10-CM

## 2017-08-28 PROCEDURE — 99214 OFFICE O/P EST MOD 30 MIN: CPT | Performed by: FAMILY MEDICINE

## 2017-08-28 RX ORDER — GABAPENTIN 300 MG/1
300 CAPSULE ORAL 3 TIMES DAILY
Qty: 90 CAP | Refills: 5 | Status: SHIPPED | OUTPATIENT
Start: 2017-08-28 | End: 2020-11-12

## 2017-08-28 RX ORDER — CYCLOBENZAPRINE HCL 5 MG
5 TABLET ORAL 2 TIMES DAILY PRN
Qty: 30 TAB | Refills: 5 | Status: SHIPPED | OUTPATIENT
Start: 2017-08-28 | End: 2020-11-12

## 2017-08-28 ASSESSMENT — PATIENT HEALTH QUESTIONNAIRE - PHQ9: CLINICAL INTERPRETATION OF PHQ2 SCORE: 0

## 2017-08-28 NOTE — ASSESSMENT & PLAN NOTE
She had stenting done in RCA in 1999. She has no chest pain, palpitations or swelling in her legs. She continues on 325 asa daily, lipitor 40 mg, zetia, losartan 50 mg.   She is followed by her cardiologist every 3-6 months. She had a normal stress test done over the last few years.

## 2017-08-28 NOTE — PROGRESS NOTES
Subjective:   Jailyn Griffiths is a 67 y.o. female here today for evaluation and management of:     AAA (abdominal aortic aneurysm) without rupture  She had repeat US to check AAA when hospitalized with PNA.   AAA is stable at 3 cm. She follows up with Dr. Martínez her vascular surgeon.   She is a  Nonsmoker with blood pressure well controlled.     Abnormal LFTs  She has years of fluctuating LFTS last labs this year in August show normal LFTS   Hep B and Hep C testing is normal.   She is not obese. She does not drink excessively: she drinks only one or two drink in the evening: rum and coke: this might be the cause. Advised her if elevated in the future, see if labs improve if she eliminates alcohol.   Her lipitor also was decreased to 40 mg from 80 mg, continues on zetia 10mg continues on these due to CAD.   Labs ordered by her cardiologist every 3-6 months.     CAD (coronary artery disease), native coronary artery  She had stenting done in RCA in 1999. She has no chest pain, palpitations or swelling in her legs. She continues on 325 asa daily, lipitor 40 mg, zetia, losartan 50 mg.   She is followed by her cardiologist every 3-6 months. She had a normal stress test done over the last few years.     Celiac disease  Controlled with diet changes.     Psoriasis of scalp  This is a chronic condition she uses clobetasol and this is controlled.     Nummular eczema  Present on scalp and lower legs.   Patches are itchy and mildly scaly  Improved with clobetasol ointment. Refill done.       Sciatica of right side  Sharp pain from right buttock with pain radiating down left leg to her foot.   Feels like she may fall at work  Pain wakes her at night when she turns  Numbness in right leg.   Will try gabapentin 300mg 1-3 tabs at night  If no improvement  Will check xray lumbar spine, may need MRI   But with stretches, small amount of ibuprofen/NSAID this hopefully with resolve on it's own.   No previous episodes and no trauma  that caused it.        Mammogram was done in January and it was normal.   Last colonoscopy was 5 years ago and it was normal.   She had a normal dexa scan about 2 years ago.       Current medicines (including changes today)  Current Outpatient Prescriptions   Medication Sig Dispense Refill   • Probiotic Product (PROBIOTIC DAILY PO) Take  by mouth.     • losartan (COZAAR) 50 MG Tab Take 1 Tab by mouth every day. 90 Tab 3   • ezetimibe (ZETIA) 10 MG Tab Take 1 Tab by mouth every day. 90 Tab 3   • atorvastatin (LIPITOR) 40 MG Tab Take 1 Tab by mouth every evening. 90 Tab 3   • clobetasol (TEMOVATE) 0.05 % external solution Apply to psoriasis to scalp twice per day x 2 weeks on then 2 weeks off 1 Bottle 3   • vitamin e (VITAMIN E) 400 UNIT CAPS Take 400 Units by mouth every day.     • aspirin (ASA) 325 MG TABS Take 325 mg by mouth every day.       No current facility-administered medications for this visit.      She  has a past medical history of AAA (abdominal aortic aneurysm) without rupture (CMS-[facility]); Abnormal LFTs (9/24/2015); CAD (coronary artery disease), native coronary artery (1999); Celiac disease (2012); Dental disorder; Dyslipidemia; Nummular eczema; Postsurgical menopause; Sleep related leg cramps; and Varicose veins of leg with complications.    ROS  No chest pain, no shortness of breath, no abdominal pain       Objective:     Blood pressure 128/76, pulse 88, temperature 36.4 °C (97.6 °F), resp. rate 16, height 1.524 m (5'), weight 64.2 kg (141 lb 9.6 oz), SpO2 97 %. Body mass index is 27.65 kg/m².   Physical Exam:  Constitutional: Alert, no distress.  Skin: Warm, dry, good turgor, small area of scaly plaque like rash mild erythema on scalp in front at hairline, some areas of rough skin with plaque on right shin.   Eye: Equal, round and reactive, conjunctiva clear, lids normal.  ENMT: Lips without lesions, good dentition, oropharynx clear.  Neck: Trachea midline, no masses, no thyromegaly. No cervical or  supraclavicular lymphadenopathy  Respiratory: Unlabored respiratory effort, lungs clear to auscultation, no wheezes, no ronchi.  Cardiovascular: Normal S1, S2, no murmur, no edema.  Abdomen: Soft, non-tender, no masses, no hepatosplenomegaly.  Psych: Alert and oriented x3, normal affect and mood.  Back: spine normal: mild right lower paraspinal tenderness and muscle knot, +straight leg raise on Right. Normal on left.       Assessment and Plan:   The following treatment plan was discussed    1. AAA (abdominal aortic aneurysm) without rupture (CMS-HCC)  Continue yearly US followed by Dr. Martínez, vascular specialist at [facility].     2. Abnormal LFTs  Normalized. Continue with lab monitoring.       3. Coronary artery disease involving native coronary artery of native heart without angina pectoris  Stable, continue on current medications, continue follow up with Dr. Black her cardiologist.     4. Celiac disease  Controlled with diet.     5. Psoriasis of scalp  Refill on clobetasol ointment done.     6. Nummular eczema  Refill on clobetasol ointment done.     7. Sciatica of right side  Continue with stretches, regular exercise like walking.   Trial gabapentin, flexeril  if no imp will check imaging: xray lumbar spine, MRI       Followup: No Follow-up on file.

## 2017-08-28 NOTE — ASSESSMENT & PLAN NOTE
Sharp pain from right buttock with pain radiating down left leg to her foot.   Feels like she may fall at work  Pain wakes her at night when she turns  Numbness in right leg.   Will try gabapentin 300mg 1-3 tabs at night  If no improvement  Will check xray lumbar spine, may need MRI   But with stretches, small amount of ibuprofen/NSAID this hopefully with resolve on it's own.   No previous episodes and no trauma that caused it.

## 2017-08-28 NOTE — ASSESSMENT & PLAN NOTE
She had repeat US to check AAA when hospitalized with PNA.   AAA is stable at 3 cm. She follows up with Dr. Martínez her vascular surgeon.   She is a  Nonsmoker with blood pressure well controlled.

## 2017-08-28 NOTE — ASSESSMENT & PLAN NOTE
Present on scalp and lower legs.   Patches are itchy and mildly scaly  Improved with clobetasol ointment. Refill done.

## 2017-08-28 NOTE — ASSESSMENT & PLAN NOTE
She has years of fluctuating LFTS last labs this year in August show normal LFTS   Hep B and Hep C testing is normal.   She is not obese. She does not drink excessively: she drinks only one or two drink in the evening: rum and coke: this might be the cause. Advised her if elevated in the future, see if labs improve if she eliminates alcohol.   Her lipitor also was decreased to 40 mg from 80 mg, continues on zetia 10mg continues on these due to CAD.   Labs ordered by her cardiologist every 3-6 months.

## 2017-09-06 ENCOUNTER — TELEPHONE (OUTPATIENT)
Dept: MEDICAL GROUP | Facility: PHYSICIAN GROUP | Age: 67
End: 2017-09-06

## 2017-09-06 DIAGNOSIS — M54.40 ACUTE LOW BACK PAIN WITH SCIATICA, SCIATICA LATERALITY UNSPECIFIED, UNSPECIFIED BACK PAIN LATERALITY: ICD-10-CM

## 2017-09-06 NOTE — TELEPHONE ENCOUNTER
Xray and MRI of lumbar spine ordered. Advise continued stretches, use of Ibuprofen/tylenol as needed for pain.   Would she like a short course of muscle relaxant and referral to physical therapy? I recommend this.   Luis Woods M.D.

## 2017-09-06 NOTE — TELEPHONE ENCOUNTER
1. Caller Name: Jailyn                      Call Back Number: [phone] (home) [phone] (work)      2. Message: Jailyn called and stated that she never picked up the medication for her muscle pain because she is not a pill person. She would like orders for the XRAY and MRI.     3. Patient approves office to leave a detailed voicemail/MyChart message: N\A

## 2017-09-11 ENCOUNTER — TELEPHONE (OUTPATIENT)
Dept: MEDICAL GROUP | Facility: PHYSICIAN GROUP | Age: 67
End: 2017-09-11

## 2017-09-11 NOTE — TELEPHONE ENCOUNTER
----- Message from Mary [place], Med Ass't sent at 9/7/2017  2:12 PM PDT -----  Contact: [phone]  Patient called and requesting xray and MRI orders faxed to her at [phone] so she can have these done before she gets her medication.    Thank you,  David/JERICA

## 2017-09-20 ENCOUNTER — TELEPHONE (OUTPATIENT)
Dept: MEDICAL GROUP | Facility: PHYSICIAN GROUP | Age: 67
End: 2017-09-20

## 2017-09-20 NOTE — TELEPHONE ENCOUNTER
1. Caller Name: Jailyn                                         Call Back Number: [phone] (home) [phone] (work)      Patient approves a detailed voicemail message: no    2. Patient is requesting imaging - MRI / scanned in MEDIA  results dated: 9/14/17    3. Confirmed results are in chart. Patient advised they will be contacted once interpreted by provider.

## 2017-09-21 ENCOUNTER — TELEPHONE (OUTPATIENT)
Dept: MEDICAL GROUP | Facility: PHYSICIAN GROUP | Age: 67
End: 2017-09-21

## 2017-09-21 DIAGNOSIS — M54.16 LUMBAR RADICULOPATHY: ICD-10-CM

## 2017-09-21 NOTE — TELEPHONE ENCOUNTER
Notified Jailyn. She will try OTC first. Already does back and leg exercises. Will see spine specialist and go from there

## 2017-09-22 NOTE — TELEPHONE ENCOUNTER
Referral done to psysiatry (pain medicine specialists)   Continue with stretches, exercises and gabapentin, flexeril and small amounts of ibuprofen if needed.   Luis Woods M.D.

## 2017-11-17 ENCOUNTER — OFFICE VISIT (OUTPATIENT)
Dept: CARDIOLOGY | Facility: PHYSICIAN GROUP | Age: 67
End: 2017-11-17
Payer: COMMERCIAL

## 2017-11-17 VITALS
DIASTOLIC BLOOD PRESSURE: 76 MMHG | BODY MASS INDEX: 23.99 KG/M2 | HEART RATE: 82 BPM | SYSTOLIC BLOOD PRESSURE: 148 MMHG | HEIGHT: 65 IN | WEIGHT: 144 LBS | OXYGEN SATURATION: 98 %

## 2017-11-17 DIAGNOSIS — I25.10 CORONARY ARTERY DISEASE INVOLVING NATIVE CORONARY ARTERY OF NATIVE HEART WITHOUT ANGINA PECTORIS: ICD-10-CM

## 2017-11-17 DIAGNOSIS — I71.40 AAA (ABDOMINAL AORTIC ANEURYSM) WITHOUT RUPTURE (HCC): ICD-10-CM

## 2017-11-17 DIAGNOSIS — E78.2 MIXED HYPERLIPIDEMIA: ICD-10-CM

## 2017-11-17 PROCEDURE — 99213 OFFICE O/P EST LOW 20 MIN: CPT | Performed by: INTERNAL MEDICINE

## 2017-11-17 ASSESSMENT — ENCOUNTER SYMPTOMS
MYALGIAS: 0
NEUROLOGICAL NEGATIVE: 1
COUGH: 0
WHEEZING: 0
ORTHOPNEA: 0
PND: 0
BRUISES/BLEEDS EASILY: 0

## 2017-11-17 ASSESSMENT — LIFESTYLE VARIABLES: SUBSTANCE_ABUSE: 0

## 2017-11-17 NOTE — PROGRESS NOTES
Subjective:   Jailyn Griffiths is a 67 y.o. female who presents today for f/u of CAD.  Cardiac status has been clinically stable since last clinic visit.  No chest pain, palpitations, orthopnea, edema, syncope, or near-syncope.  Exertional capacity has been stable.  No interval change otherwise.  Past Medical History:   Diagnosis Date   • AAA (abdominal aortic aneurysm) without rupture (CMS-HCC)     Followed by Dr. Martínez   • Abnormal LFTs 09/24/2015    Neg hepatitis panel, possibly due to medication   • CAD (coronary artery disease), native coronary artery 1999    3x18mm Duet stent RCA   • Celiac disease 2012    With protein losing enteropathy and transient apparent congestive failure provoked by that with normal echocardiogram. Evaluated by gastrointestinal consultants, Dr. Lira   • Dental disorder     dentures [K08.9]   • Dyslipidemia     Elevated HDL and LDL   • Nummular eczema    • Postsurgical menopause     at 29 yo due to bleeding   • Sleep related leg cramps    • Varicose veins of leg with complications      Past Surgical History:   Procedure Laterality Date   • VEIN LIGATION RADIO FREQUENCY  7/19/2010    Performed by CHARLENE ROSE at SURGERY [facility]   • STENT PLACEMENT  1996    RCA done emergently   • GERARD BY LAPAROSCOPY  1996   • APPENDECTOMY     • BREAST BIOPSY      left, normal   • GYN SURGERY  over 30 years ago    hysterectomy   • HYSTERECTOMY, TOTAL ABDOMINAL      with BSO did HRT for 25 yrs now off   • TONSILLECTOMY AND ADENOIDECTOMY       Family History   Problem Relation Age of Onset   • Cancer Mother      breast cancer at age 75   • Diabetes Mother    • Diabetes Father    • Hypertension Father    • Hyperlipidemia Father    • Stroke Father    • Cancer Maternal Grandfather      colon cancer   • Heart Disease Paternal Grandmother      History   Smoking Status   • Former Smoker   • Packs/day: 1.50   • Years: 30.00   • Types: Cigarettes   Smokeless Tobacco   • Never Used     Comment: 1 ppd  "20 yrs,quit 1999     Allergies   Allergen Reactions   • Nkda [No Known Drug Allergy]      Outpatient Encounter Prescriptions as of 11/17/2017   Medication Sig Dispense Refill   • gabapentin (NEURONTIN) 300 MG Cap Take 1 Cap by mouth 3 times a day. 90 Cap 5   • cyclobenzaprine (FLEXERIL) 5 MG tablet Take 1 Tab by mouth 2 times a day as needed. 30 Tab 5   • Probiotic Product (PROBIOTIC DAILY PO) Take  by mouth.     • losartan (COZAAR) 50 MG Tab Take 1 Tab by mouth every day. 90 Tab 3   • ezetimibe (ZETIA) 10 MG Tab Take 1 Tab by mouth every day. 90 Tab 3   • atorvastatin (LIPITOR) 40 MG Tab Take 1 Tab by mouth every evening. 90 Tab 3   • clobetasol (TEMOVATE) 0.05 % external solution Apply to psoriasis to scalp twice per day x 2 weeks on then 2 weeks off 1 Bottle 3   • vitamin e (VITAMIN E) 400 UNIT CAPS Take 400 Units by mouth every day.     • aspirin (ASA) 325 MG TABS Take 325 mg by mouth every day.       No facility-administered encounter medications on file as of 11/17/2017.      Review of Systems   Respiratory: Negative for cough and wheezing.    Cardiovascular: Negative for orthopnea and PND.   Musculoskeletal: Negative for myalgias.   Neurological: Negative.    Endo/Heme/Allergies: Does not bruise/bleed easily.   Psychiatric/Behavioral: Negative for substance abuse.        Objective:   /76   Pulse 82   Ht 1.651 m (5' 5\")   Wt 65.3 kg (144 lb)   SpO2 98%   BMI 23.96 kg/m²     Physical Exam   Constitutional: She appears well-developed and well-nourished.   Eyes: Conjunctivae are normal. No scleral icterus.   Neck: No JVD present.   Cardiovascular: Normal rate, regular rhythm, normal heart sounds and intact distal pulses.  Exam reveals no gallop.    No murmur heard.  Pulmonary/Chest: Effort normal and breath sounds normal.   Musculoskeletal: She exhibits no edema.   Skin: Skin is warm and dry.   Psychiatric: She has a normal mood and affect. Thought content normal.       Assessment:     1. Coronary " artery disease involving native coronary artery of native heart without angina pectoris  CBC WITH DIFFERENTIAL   2. Mixed hyperlipidemia  LIPID PROFILE        COMP METABOLIC PANEL    TSH   3. AAA (abdominal aortic aneurysm) without rupture (CMS-HCC)  ABDOMINAL AORTA ULTRASOUND     The above assessed cardiovascular problems are clinically stable.  Medical Decision Making:  Today's Assessment / Status / Plan:   Continue the current cardiovascular regimen.  Continue primary follow up with  Dr. Woods.   Cardiology follow up in 6 months, VS follow up with  and  sooner if needed for any change.   Lab and AA US before next visit.  Use of the emergency medical system reviewed.

## 2017-11-17 NOTE — LETTER
[facility] [address]  Phone: [phone]  Fax: [phone]              Jailyn Griffiths  1950    Encounter Date: 11/17/2017    Shiv Black M.D.          PROGRESS NOTE:  Subjective:   Jailyn Griffiths is a 67 y.o. female who presents today for f/u of CAD.  Cardiac status has been clinically stable since last clinic visit.  No chest pain, palpitations, orthopnea, edema, syncope, or near-syncope.  Exertional capacity has been stable.  No interval change otherwise.  Past Medical History:   Diagnosis Date   • AAA (abdominal aortic aneurysm) without rupture (CMS-HCC)     Followed by Dr. Martínez   • Abnormal LFTs 09/24/2015    Neg hepatitis panel, possibly due to medication   • CAD (coronary artery disease), native coronary artery 1999    3x18mm Duet stent RCA   • Celiac disease 2012    With protein losing enteropathy and transient apparent congestive failure provoked by that with normal echocardiogram. Evaluated by gastrointestinal consultants, Dr. Lira   • Dental disorder     dentures [K08.9]   • Dyslipidemia     Elevated HDL and LDL   • Nummular eczema    • Postsurgical menopause     at 27 yo due to bleeding   • Sleep related leg cramps    • Varicose veins of leg with complications      Past Surgical History:   Procedure Laterality Date   • VEIN LIGATION RADIO FREQUENCY  7/19/2010    Performed by CHARLENE ROSE at SURGERY [facility] ORS   • STENT PLACEMENT  1996    RCA done emergently   • GERARD BY LAPAROSCOPY  1996   • APPENDECTOMY     • BREAST BIOPSY      left, normal   • GYN SURGERY  over 30 years ago    hysterectomy   • HYSTERECTOMY, TOTAL ABDOMINAL      with BSO did HRT for 25 yrs now off   • TONSILLECTOMY AND ADENOIDECTOMY       Family History   Problem Relation Age of Onset   • Cancer Mother      breast cancer at age 75   • Diabetes Mother    • Diabetes Father    • Hypertension Father    • Hyperlipidemia  "Father    • Stroke Father    • Cancer Maternal Grandfather      colon cancer   • Heart Disease Paternal Grandmother      History   Smoking Status   • Former Smoker   • Packs/day: 1.50   • Years: 30.00   • Types: Cigarettes   Smokeless Tobacco   • Never Used     Comment: 1 ppd 20 yrs,quit 1999     Allergies   Allergen Reactions   • Nkda [No Known Drug Allergy]      Outpatient Encounter Prescriptions as of 11/17/2017   Medication Sig Dispense Refill   • gabapentin (NEURONTIN) 300 MG Cap Take 1 Cap by mouth 3 times a day. 90 Cap 5   • cyclobenzaprine (FLEXERIL) 5 MG tablet Take 1 Tab by mouth 2 times a day as needed. 30 Tab 5   • Probiotic Product (PROBIOTIC DAILY PO) Take  by mouth.     • losartan (COZAAR) 50 MG Tab Take 1 Tab by mouth every day. 90 Tab 3   • ezetimibe (ZETIA) 10 MG Tab Take 1 Tab by mouth every day. 90 Tab 3   • atorvastatin (LIPITOR) 40 MG Tab Take 1 Tab by mouth every evening. 90 Tab 3   • clobetasol (TEMOVATE) 0.05 % external solution Apply to psoriasis to scalp twice per day x 2 weeks on then 2 weeks off 1 Bottle 3   • vitamin e (VITAMIN E) 400 UNIT CAPS Take 400 Units by mouth every day.     • aspirin (ASA) 325 MG TABS Take 325 mg by mouth every day.       No facility-administered encounter medications on file as of 11/17/2017.      Review of Systems   Respiratory: Negative for cough and wheezing.    Cardiovascular: Negative for orthopnea and PND.   Musculoskeletal: Negative for myalgias.   Neurological: Negative.    Endo/Heme/Allergies: Does not bruise/bleed easily.   Psychiatric/Behavioral: Negative for substance abuse.        Objective:   /76   Pulse 82   Ht 1.651 m (5' 5\")   Wt 65.3 kg (144 lb)   SpO2 98%   BMI 23.96 kg/m²      Physical Exam   Constitutional: She appears well-developed and well-nourished.   Eyes: Conjunctivae are normal. No scleral icterus.   Neck: No JVD present.   Cardiovascular: Normal rate, regular rhythm, normal heart sounds and intact distal pulses.  Exam " reveals no gallop.    No murmur heard.  Pulmonary/Chest: Effort normal and breath sounds normal.   Musculoskeletal: She exhibits no edema.   Skin: Skin is warm and dry.   Psychiatric: She has a normal mood and affect. Thought content normal.       Assessment:     1. Coronary artery disease involving native coronary artery of native heart without angina pectoris  CBC WITH DIFFERENTIAL   2. Mixed hyperlipidemia  LIPID PROFILE        COMP METABOLIC PANEL    TSH   3. AAA (abdominal aortic aneurysm) without rupture (CMS-[facility])  ABDOMINAL AORTA ULTRASOUND     The above assessed cardiovascular problems are clinically stable.  Medical Decision Making:  Today's Assessment / Status / Plan:   Continue the current cardiovascular regimen.  Continue primary follow up with  Dr. Woods.   Cardiology follow up in 6 months, VS follow up with  and  sooner if needed for any change.   Lab and AA US before next visit.  Use of the emergency medical system reviewed.       Luis Woods M.D.  [address] [place] [address]  VIA In Basket

## 2017-12-07 DIAGNOSIS — E78.00 PURE HYPERCHOLESTEROLEMIA: ICD-10-CM

## 2017-12-07 DIAGNOSIS — I10 ESSENTIAL HYPERTENSION, BENIGN: ICD-10-CM

## 2017-12-07 RX ORDER — EZETIMIBE 10 MG/1
10 TABLET ORAL
Qty: 90 TAB | Refills: 3 | Status: SHIPPED | OUTPATIENT
Start: 2017-12-07 | End: 2018-12-03 | Stop reason: SDUPTHER

## 2017-12-07 RX ORDER — ATORVASTATIN CALCIUM 40 MG/1
40 TABLET, FILM COATED ORAL EVERY EVENING
Qty: 90 TAB | Refills: 3 | Status: SHIPPED | OUTPATIENT
Start: 2017-12-07 | End: 2017-12-07 | Stop reason: SDUPTHER

## 2017-12-07 RX ORDER — LOSARTAN POTASSIUM 50 MG/1
50 TABLET ORAL DAILY
Qty: 90 TAB | Refills: 3 | Status: SHIPPED | OUTPATIENT
Start: 2017-12-07 | End: 2017-12-07 | Stop reason: SDUPTHER

## 2017-12-07 RX ORDER — ATORVASTATIN CALCIUM 40 MG/1
40 TABLET, FILM COATED ORAL EVERY EVENING
Qty: 90 TAB | Refills: 3 | Status: SHIPPED | OUTPATIENT
Start: 2017-12-07 | End: 2018-12-03 | Stop reason: SDUPTHER

## 2017-12-07 RX ORDER — EZETIMIBE 10 MG/1
10 TABLET ORAL
Qty: 90 TAB | Refills: 3 | Status: SHIPPED | OUTPATIENT
Start: 2017-12-07 | End: 2017-12-07 | Stop reason: SDUPTHER

## 2017-12-07 RX ORDER — LOSARTAN POTASSIUM 50 MG/1
50 TABLET ORAL DAILY
Qty: 90 TAB | Refills: 3 | Status: SHIPPED | OUTPATIENT
Start: 2017-12-07 | End: 2018-12-03 | Stop reason: SDUPTHER

## 2017-12-26 ENCOUNTER — TELEPHONE (OUTPATIENT)
Dept: MEDICAL GROUP | Facility: PHYSICIAN GROUP | Age: 67
End: 2017-12-26

## 2018-07-23 ENCOUNTER — TELEPHONE (OUTPATIENT)
Dept: CARDIOLOGY | Facility: PHYSICIAN GROUP | Age: 68
End: 2018-07-23

## 2018-07-30 NOTE — TELEPHONE ENCOUNTER
"Message from the    \"Pt returning your call, reports she will have labs done next week at [facility]. If question's can be reached at  [phone].\"  Will request for labs from [facility] for appt with PORSHA dominguez 8/6/2018  "

## 2018-08-06 ENCOUNTER — OFFICE VISIT (OUTPATIENT)
Dept: CARDIOLOGY | Facility: PHYSICIAN GROUP | Age: 68
End: 2018-08-06
Payer: COMMERCIAL

## 2018-08-06 VITALS
OXYGEN SATURATION: 99 % | HEIGHT: 65 IN | WEIGHT: 152 LBS | DIASTOLIC BLOOD PRESSURE: 82 MMHG | HEART RATE: 80 BPM | SYSTOLIC BLOOD PRESSURE: 142 MMHG | BODY MASS INDEX: 25.33 KG/M2

## 2018-08-06 DIAGNOSIS — I25.10 CORONARY ARTERY DISEASE INVOLVING NATIVE CORONARY ARTERY OF NATIVE HEART WITHOUT ANGINA PECTORIS: ICD-10-CM

## 2018-08-06 DIAGNOSIS — Z95.5 STENTED CORONARY ARTERY: ICD-10-CM

## 2018-08-06 DIAGNOSIS — I71.40 AAA (ABDOMINAL AORTIC ANEURYSM) WITHOUT RUPTURE (HCC): ICD-10-CM

## 2018-08-06 DIAGNOSIS — R79.89 ABNORMAL LFTS: ICD-10-CM

## 2018-08-06 DIAGNOSIS — I10 ESSENTIAL HYPERTENSION, BENIGN: ICD-10-CM

## 2018-08-06 DIAGNOSIS — E78.5 DYSLIPIDEMIA: ICD-10-CM

## 2018-08-06 PROCEDURE — 99214 OFFICE O/P EST MOD 30 MIN: CPT | Performed by: INTERNAL MEDICINE

## 2018-08-06 NOTE — PATIENT INSTRUCTIONS
Please decrease the amount of meat and animal products in your diet.  Please consider checking Vitaly Valiente's website for advice on diet changes.    Please get fasting labs in 6 months.

## 2018-08-06 NOTE — PROGRESS NOTES
Cardiology Follow-up Consultation Note    Date of note:    8/6/2018    Primary Care Provider: Luis Woods M.D.  Referring Provider: Sierra Black    Patient Name: Jailyn Griffiths     YOB: 1950  MRN:              [id number]    Chief Complaint: CAD    History of Present Illness: Jailyn Griffiths is a 68 y.o. female whose current medical problems include AAA, CAD s/p PCI 1999, celiac disease, hypertension, and dyslipidemia who is here for follow-up.    Last seen by Dr. Black on 11/17/2017.    Interim Events:  In terms of hypertension, does not measure BP at home.  At work, typically in the 120s.    In terms of dyslipidemia, poorly controlled, on lipitor 40.  LDL >100. Previously on lipitor 80, however LFTs increased so dose was down to 40.      In terms of CAD, no angina.    Does have some leg swelling in left foot, which has improved with NSAIDs. Worse in the morning.     ROS  Constitutional: Negative for chills, fever, weakness, night sweats, weight gain and weight loss.   Eyes: Negative for double vision, vision loss in left eye and vision loss in right eye.   Cardiovascular: see HPI.  Respiratory: Negative for cough, shortness of breath, and wheezing.    Musculoskeletal: Negative for joint swelling, muscle cramps, muscle weakness and myalgias.   Gastrointestinal: Negative for abdominal pain, hematochezia, hemorrhoids and melena.   Genitourinary: Negative for frequency and hematuria.   Neurological: Negative for dizziness, focal weakness, light-headedness, numbness, paresthesias.      Past Medical History:   Diagnosis Date   • AAA (abdominal aortic aneurysm) without rupture (HCC)     Followed by Dr. Martínez   • Abnormal LFTs 09/24/2015    Neg hepatitis panel, possibly due to medication   • CAD (coronary artery disease), native coronary artery 1999    3x18mm Duet stent RCA   • Celiac disease 2012    With protein losing enteropathy and transient apparent congestive failure provoked  by that with normal echocardiogram. Evaluated by gastrointestinal consultants, Dr. Lira   • Dental disorder     dentures [K08.9]   • Dyslipidemia     Elevated HDL and LDL   • Nummular eczema    • Postsurgical menopause     at 27 yo due to bleeding   • Sleep related leg cramps    • Varicose veins of leg with complications          Past Surgical History:   Procedure Laterality Date   • VEIN LIGATION RADIO FREQUENCY  7/19/2010    Performed by CHARLENE ROSE at SURGERY [facility]   • STENT PLACEMENT  1996    RCA done emergently   • GERARD BY LAPAROSCOPY  1996   • APPENDECTOMY     • BREAST BIOPSY      left, normal   • GYN SURGERY  over 30 years ago    hysterectomy   • HYSTERECTOMY, TOTAL ABDOMINAL      with BSO did HRT for 25 yrs now off   • TONSILLECTOMY AND ADENOIDECTOMY           Current Outpatient Prescriptions   Medication Sig Dispense Refill   • ezetimibe (ZETIA) 10 MG Tab Take 1 Tab by mouth every day. 90 Tab 3   • atorvastatin (LIPITOR) 40 MG Tab Take 1 Tab by mouth every evening. 90 Tab 3   • losartan (COZAAR) 50 MG Tab Take 1 Tab by mouth every day. 90 Tab 3   • gabapentin (NEURONTIN) 300 MG Cap Take 1 Cap by mouth 3 times a day. 90 Cap 5   • cyclobenzaprine (FLEXERIL) 5 MG tablet Take 1 Tab by mouth 2 times a day as needed. 30 Tab 5   • vitamin e (VITAMIN E) 400 UNIT CAPS Take 400 Units by mouth every day.     • aspirin (ASA) 325 MG TABS Take 325 mg by mouth every day.     • Probiotic Product (PROBIOTIC DAILY PO) Take  by mouth.     • clobetasol (TEMOVATE) 0.05 % external solution Apply to psoriasis to scalp twice per day x 2 weeks on then 2 weeks off 1 Bottle 3     No current facility-administered medications for this visit.          Allergies   Allergen Reactions   • Nkda [No Known Drug Allergy]          Family History   Problem Relation Age of Onset   • Cancer Mother         breast cancer at age 75   • Diabetes Mother    • Diabetes Father    • Hypertension Father    • Hyperlipidemia Father    • Stroke  "Father    • Cancer Maternal Grandfather         colon cancer   • Heart Disease Paternal Grandmother          Social History     Social History   • Marital status:      Spouse name: N/A   • Number of children: N/A   • Years of education: N/A     Occupational History   • Not on file.     Social History Main Topics   • Smoking status: Former Smoker     Packs/day: 1.50     Years: 30.00     Types: Cigarettes   • Smokeless tobacco: Never Used      Comment: 1 ppd 20 yrs,quit 1999   • Alcohol use 3.5 oz/week     7 Glasses of wine per week      Comment: 2 per day   • Drug use: No   • Sexual activity: Yes     Partners: Male     Birth control/ protection: Post-Menopausal      Comment:      Other Topics Concern   •  Service No   • Blood Transfusions No   • Caffeine Concern No   • Occupational Exposure No   • Hobby Hazards No   • Sleep Concern No   • Stress Concern No   • Weight Concern No   • Special Diet No   • Back Care No   • Exercise No   • Bike Helmet No   • Seat Belt Yes   • Self-Exams Yes     Social History Narrative   • No narrative on file         Physical Exam:  Ambulatory Vitals  Blood pressure 142/82, pulse 80, height 1.651 m (5' 5\"), weight 68.9 kg (152 lb), SpO2 99 %.   Oxygen Therapy:  Pulse Oximetry: 99 %  BP Readings from Last 4 Encounters:   08/06/18 142/82   11/17/17 148/76   08/28/17 128/76   07/11/17 130/76       Weight/BMI: Body mass index is 25.29 kg/m².  Wt Readings from Last 4 Encounters:   08/06/18 68.9 kg (152 lb)   11/17/17 65.3 kg (144 lb)   08/28/17 64.2 kg (141 lb 9.6 oz)   07/11/17 63.5 kg (140 lb)       General: Well appearing and in no apparent distress  Eyes: nl conjunctiva  ENT: OP clear, normal external appearance of nose and ears  Neck: JVP <8 cm H2O, no carotid bruits  Lungs: normal respiratory effort, CTAB  Heart: RRR, no murmurs, no rubs or gallops, no edema bilateral lower extremities. No LV/RV heave on cardiac palpatation. 2+ bilateral radial pulses.  2+ bilateral " dp pulses.   Abdomen: soft, non tender, non distended, no masses, normal bowel sounds.  No HSM.  Extremities/MSK: no clubbing, no cyanosis  Neurological: No focal sensory deficits  Psychiatric: Appropriate affect, A/O x 3, intact judgement and insight  Skin: Warm extremities    Lab Data Review:  No results found for: CHOLSTRLTOT, LDL, HDL, TRIGLYCERIDE    No results found for: SODIUM, POTASSIUM, CHLORIDE, CO2, GLUCOSE, BUN, CREATININE, BUNCREATRAT, GLOMRATE  No results found for: ALKPHOSPHAT, ASTSGOT, ALTSGPT, TBILIRUBIN   No results found for: WBC  No components found for: HBGA1C  No components found for: TROPONIN  No components found for: BNP    OSH labs 8/3/2018:  hgb 11.8  plt 246  Creatinine 0.8  Bun 11  Sodium 136  Potassium 4.4  ast 76 (ULNM 41)  ALT 81 (ULNM 46  Alk phos 82  Total bili 0.7      HDL 82    tg 108  TSH 2.3      Cardiac Imaging and Procedures Review:    EKG dated 2018 : My personal interpretation is NSR, normal EKG.     [facility] (): RCA stenting.     Radiology test Review:  Abdominal US 2018:  4.2cm x 3.9cm x 7.5cm long.  Stable since 2016. Infrarenal.       Medical Decision Makin. Coronary artery disease involving native coronary artery of native heart without angina pectoris  -continue aspirin, at higher dose per Dr. Black.  -continue statin.  -EF WNL per patient based on imaging 20 years ago    2. AAA (abdominal aortic aneurysm) without rupture (HCC)  Stable  -CTM Q2 years    3. Abnormal LFTs  Mildly elevation, possibly due to statin.    -check again in 6 months    4. Essential hypertension, benign  Well controlled at home.    5. Dyslipidemia  Poorly controlled in setting of CAD.  -discussed PCSK9 vs increase lipitor vs transition to vegetarian diet.  She prefers diet option for now.  -f/u lipid panel in 6 months        Return in about 6 months (around 2019).      Sancho Badillo MD, [facility]  [facility], [facility]  [address] [address]  [place] [address]  Phone: [phone]  Fax: [phone]

## 2018-10-25 ENCOUNTER — TELEPHONE (OUTPATIENT)
Dept: CARDIOLOGY | Facility: MEDICAL CENTER | Age: 68
End: 2018-10-25

## 2018-10-25 NOTE — TELEPHONE ENCOUNTER
"patient calling for surgical clearance   Received: Today   Message Contents   MARIETTA Graham/Rosalva       Patient is having surgery on 11/05 and was just informed by her surgeon she needs cardiac clearance. She can be reached at [phone] or [phone].      Contacted patient.  Patient states she is scheduled on 11/5 for \"back surgery\" with Dr. Andrew from [facility] and was informed by [facility] today that she will need cardiac clearance.      Last OV with PORSHA on 8/6/18  EKG on 8/6/18    Advised to have clearance form faxed to clinic      To PORSHA to review/clear   FYI to PORSHA's RN        "

## 2018-10-25 NOTE — LETTER
PROCEDURE/SURGERY CLEARANCE FORM      Encounter Date: 10/25/2018    Patient: Jailyn Griffiths  YOB: 1950    CARDIOLOGIST:  Sancho Badillo M.D.    REFERRING DOCTOR:  Marlene Andrew M.D.        The above patient is moderate risk from cardiovascular standpoint to have the following procedure/surgery: Back Surgery- Lumbar Fusion                                           Additional comments: Patient is medically optimized. She can be on aspirin 81mg PO daily only during the george-operative period.                       MD Amanda Badillo M.D.

## 2018-10-29 NOTE — TELEPHONE ENCOUNTER
Clearance request received also from [facility], pt is scheduled for Lumbar Fusion w/ Dr Andrew    Clearance drafted w/ Dr Badillo recommendations, faxed to [facility], F#[phone]    Copy of clearance to scanning     Called pt and notified, pt verbalizes understanding

## 2018-10-29 NOTE — TELEPHONE ENCOUNTER
Ok for surgery, she is moderate risk for moderate risk surgery and medically optimized.  She can be on aspirin 81mg PO daily only during the george-operative period.  Please let surgeons and patient know. Thanks!

## 2018-12-03 DIAGNOSIS — I10 ESSENTIAL HYPERTENSION, BENIGN: ICD-10-CM

## 2018-12-03 DIAGNOSIS — E78.00 PURE HYPERCHOLESTEROLEMIA: ICD-10-CM

## 2018-12-03 RX ORDER — LOSARTAN POTASSIUM 50 MG/1
50 TABLET ORAL DAILY
Qty: 90 TAB | Refills: 3 | Status: SHIPPED | OUTPATIENT
Start: 2018-12-03 | End: 2019-02-06 | Stop reason: SDUPTHER

## 2018-12-03 RX ORDER — EZETIMIBE 10 MG/1
10 TABLET ORAL
Qty: 90 TAB | Refills: 3 | Status: SHIPPED | OUTPATIENT
Start: 2018-12-03 | End: 2019-02-06 | Stop reason: SDUPTHER

## 2018-12-03 RX ORDER — ATORVASTATIN CALCIUM 40 MG/1
40 TABLET, FILM COATED ORAL EVERY EVENING
Qty: 90 TAB | Refills: 3 | Status: SHIPPED | OUTPATIENT
Start: 2018-12-03 | End: 2019-02-04 | Stop reason: SDUPTHER

## 2019-02-04 ENCOUNTER — OFFICE VISIT (OUTPATIENT)
Dept: CARDIOLOGY | Facility: PHYSICIAN GROUP | Age: 69
End: 2019-02-04
Payer: COMMERCIAL

## 2019-02-04 VITALS
HEART RATE: 84 BPM | DIASTOLIC BLOOD PRESSURE: 78 MMHG | SYSTOLIC BLOOD PRESSURE: 120 MMHG | WEIGHT: 147 LBS | OXYGEN SATURATION: 97 % | BODY MASS INDEX: 24.49 KG/M2 | HEIGHT: 65 IN

## 2019-02-04 DIAGNOSIS — I25.10 CORONARY ARTERY DISEASE INVOLVING NATIVE CORONARY ARTERY OF NATIVE HEART WITHOUT ANGINA PECTORIS: ICD-10-CM

## 2019-02-04 DIAGNOSIS — I10 ESSENTIAL HYPERTENSION, BENIGN: ICD-10-CM

## 2019-02-04 DIAGNOSIS — E78.5 DYSLIPIDEMIA: ICD-10-CM

## 2019-02-04 DIAGNOSIS — R79.89 ABNORMAL LFTS: ICD-10-CM

## 2019-02-04 DIAGNOSIS — E78.00 PURE HYPERCHOLESTEROLEMIA: ICD-10-CM

## 2019-02-04 DIAGNOSIS — I71.40 AAA (ABDOMINAL AORTIC ANEURYSM) WITHOUT RUPTURE (HCC): ICD-10-CM

## 2019-02-04 PROCEDURE — 99214 OFFICE O/P EST MOD 30 MIN: CPT | Performed by: INTERNAL MEDICINE

## 2019-02-04 RX ORDER — ATORVASTATIN CALCIUM 80 MG/1
80 TABLET, FILM COATED ORAL EVERY EVENING
Qty: 90 TAB | Refills: 3 | Status: SHIPPED | OUTPATIENT
Start: 2019-02-04 | End: 2019-02-06 | Stop reason: SDUPTHER

## 2019-02-04 NOTE — PROGRESS NOTES
Cardiology Follow-up Consultation Note    Date of note:    2/4/2019  Primary Care Provider: Luis Woods M.D.  Referring Provider: Sierra Black    Patient Name: Jailyn Griffiths     YOB: 1950  MRN:              [id number]    Chief Complaint: CAD    History of Present Illness: Jailyn Griffiths is a 69 y.o. female whose current medical problems include AAA, CAD s/p PCI 1999, celiac disease, hypertension, and dyslipidemia who is here for follow-up.    At our visit, 8/6/2018:  In terms of hypertension, does not measure BP at home.  At work, typically in the 120s.    In terms of dyslipidemia, poorly controlled, on lipitor 40.  LDL >100. Previously on lipitor 80, however LFTs increased so dose was down to 40.      Does have some leg swelling in left foot, which has improved with NSAIDs. Worse in the morning.       Interim Events:  Had back surgery.     In terms of CAD, no angina. Not exercising besides at work.     In terms of hypertension, well controlled.     In terms of dyslipidemia, slightly improved with diet, but inadequately controlled.         ROS  Constitution: Negative for chills, fever and night sweats.   HENT: Negative for nosebleeds.    Eyes: Negative for vision loss in left eye and vision loss in right eye.   Respiratory: Negative for hemoptysis.    Gastrointestinal: Negative for hematemesis, hematochezia and melena.   Genitourinary: Negative for hematuria.   Neurological: Negative for focal weakness, numbness and paresthesias.       Past Medical History:   Diagnosis Date   • AAA (abdominal aortic aneurysm) without rupture (HCC)     Followed by Dr. Martínez   • Abnormal LFTs 09/24/2015    Neg hepatitis panel, possibly due to medication   • CAD (coronary artery disease), native coronary artery 1999    3x18mm Duet stent RCA   • Celiac disease 2012    With protein losing enteropathy and transient apparent congestive failure provoked by that with normal echocardiogram. Evaluated by  gastrointestinal consultants, Dr. Lira   • Dental disorder     dentures [K08.9]   • Dyslipidemia     Elevated HDL and LDL   • Hypertension    • Nummular eczema    • Postsurgical menopause     at 29 yo due to bleeding   • Sleep related leg cramps    • Varicose veins of leg with complications          Past Surgical History:   Procedure Laterality Date   • VEIN LIGATION RADIO FREQUENCY  7/19/2010    Performed by CHARLENE ROSE at SURGERY [facility]   • STENT PLACEMENT  1996    RCA done emergently   • GERARD BY LAPAROSCOPY  1996   • APPENDECTOMY     • BREAST BIOPSY      left, normal   • GYN SURGERY  over 30 years ago    hysterectomy   • HYSTERECTOMY, TOTAL ABDOMINAL      with BSO did HRT for 25 yrs now off   • TONSILLECTOMY AND ADENOIDECTOMY           Current Outpatient Prescriptions   Medication Sig Dispense Refill   • losartan (COZAAR) 50 MG Tab Take 1 Tab by mouth every day. 90 Tab 3   • ezetimibe (ZETIA) 10 MG Tab Take 1 Tab by mouth every day. 90 Tab 3   • atorvastatin (LIPITOR) 40 MG Tab Take 1 Tab by mouth every evening. 90 Tab 3   • gabapentin (NEURONTIN) 300 MG Cap Take 1 Cap by mouth 3 times a day. 90 Cap 5   • cyclobenzaprine (FLEXERIL) 5 MG tablet Take 1 Tab by mouth 2 times a day as needed. 30 Tab 5   • vitamin e (VITAMIN E) 400 UNIT CAPS Take 400 Units by mouth every day.     • aspirin (ASA) 325 MG TABS Take 81 mg by mouth every day.     • Probiotic Product (PROBIOTIC DAILY PO) Take  by mouth.     • clobetasol (TEMOVATE) 0.05 % external solution Apply to psoriasis to scalp twice per day x 2 weeks on then 2 weeks off 1 Bottle 3     No current facility-administered medications for this visit.          Allergies   Allergen Reactions   • Nkda [No Known Drug Allergy]          Family History   Problem Relation Age of Onset   • Cancer Mother         breast cancer at age 75   • Diabetes Mother    • Diabetes Father    • Hypertension Father    • Hyperlipidemia Father    • Stroke Father    • Cancer Maternal  "Grandfather         colon cancer   • Heart Disease Paternal Grandmother          Social History     Social History   • Marital status:      Spouse name: N/A   • Number of children: N/A   • Years of education: N/A     Occupational History   • Not on file.     Social History Main Topics   • Smoking status: Former Smoker     Packs/day: 1.50     Years: 30.00     Types: Cigarettes   • Smokeless tobacco: Never Used      Comment: 1 ppd 20 yrs,quit 1999   • Alcohol use 8.4 oz/week     14 Glasses of wine per week      Comment: 2 per day   • Drug use: No   • Sexual activity: Yes     Partners: Male     Birth control/ protection: Post-Menopausal      Comment:      Other Topics Concern   •  Service No   • Blood Transfusions No   • Caffeine Concern No   • Occupational Exposure No   • Hobby Hazards No   • Sleep Concern No   • Stress Concern No   • Weight Concern No   • Special Diet No   • Back Care No   • Exercise No   • Bike Helmet No   • Seat Belt Yes   • Self-Exams Yes     Social History Narrative    Works in [employer].          Physical Exam:  Ambulatory Vitals  Blood pressure 120/78, pulse 84, height 1.651 m (5' 5\"), weight 66.7 kg (147 lb), SpO2 97 %, not currently breastfeeding.   Oxygen Therapy:  Pulse Oximetry: 97 %  BP Readings from Last 4 Encounters:   02/04/19 120/78   08/06/18 142/82   11/17/17 148/76   08/28/17 128/76       Weight/BMI: Body mass index is 24.46 kg/m².  Wt Readings from Last 4 Encounters:   02/04/19 66.7 kg (147 lb)   08/06/18 68.9 kg (152 lb)   11/17/17 65.3 kg (144 lb)   08/28/17 64.2 kg (141 lb 9.6 oz)       General: Well appearing and in no apparent distress  Eyes: nl conjunctiva  ENT: OP clear, normal external appearance of nose and ears  Neck: JVP <8 cm H2O, no carotid bruits  Lungs: normal respiratory effort, CTAB  Heart: RRR, no murmurs, no rubs or gallops, no edema bilateral lower extremities. No LV/RV heave on cardiac palpatation. 2+ bilateral radial " pulses.  2+ bilateral dp pulses.   Abdomen: soft, non tender, non distended, no masses, normal bowel sounds.  No HSM.  Extremities/MSK: no clubbing, no cyanosis  Neurological: No focal sensory deficits  Psychiatric: Appropriate affect, A/O x 3, intact judgement and insight  Skin: Warm extremities    Exam repeated in full and unchanged except for as noted above.      Lab Data Review:  No results found for: CHOLSTRLTOT, LDL, HDL, TRIGLYCERIDE    No results found for: SODIUM, POTASSIUM, CHLORIDE, CO2, GLUCOSE, BUN, CREATININE, BUNCREATRAT, GLOMRATE  No results found for: ALKPHOSPHAT, ASTSGOT, ALTSGPT, TBILIRUBIN   No results found for: WBC  No components found for: HBGA1C  No components found for: TROPONIN  No components found for: BNP    OSH labs 8/3/2018:  hgb 11.8  plt 246  Creatinine 0.8  Bun 11  Sodium 136  Potassium 4.4  ast 76 (ULNM 41)  ALT 81 (ULNM 46  Alk phos 82  Total bili 0.7      HDL 82    tg 108  TSH 2.3    OSH labs 2019  LFTs WNL except 53 AST    HD 64    tg 84      Cardiac Imaging and Procedures Review:    EKG dated 2018 : My personal interpretation is NSR, normal EKG.     [facility] (): RCA stenting.     Radiology test Review:  Abdominal US 2018:  4.2cm x 3.9cm x 7.5cm long.  Stable since 2016. Infrarenal.       Medical Decision Makin. Coronary artery disease involving native coronary artery of native heart without angina pectoris  -continue aspirin, at higher dose per Dr. Black.  -continue statin.  -EF WNL per patient based on imaging 20 years ago    2. AAA (abdominal aortic aneurysm) without rupture (HCC)  Stable  -repeat abdominal ultrasound 2019.     3. Abnormal LFTs  Mildly elevation, mostly resolved.  -check again in 6 months    4. Essential hypertension, benign  Well controlled at home.    5. Dyslipidemia  Poorly controlled in setting of CAD.  -increase lipitor to 80mg PO daily  -continue zetia  -f/u lipid panel in 6 months        Return in  about 1 year (around 2/4/2020).      Sancho Badillo MD, [facility] Advanced Medicine, [address]ld[address]  [address] [place] [address]  Phone: [phone]  Fax: [phone]

## 2019-02-06 ENCOUNTER — TELEPHONE (OUTPATIENT)
Dept: CARDIOLOGY | Facility: MEDICAL CENTER | Age: 69
End: 2019-02-06

## 2019-02-06 DIAGNOSIS — E78.00 PURE HYPERCHOLESTEROLEMIA: ICD-10-CM

## 2019-02-06 DIAGNOSIS — I10 ESSENTIAL HYPERTENSION, BENIGN: ICD-10-CM

## 2019-02-06 RX ORDER — ATORVASTATIN CALCIUM 80 MG/1
80 TABLET, FILM COATED ORAL EVERY EVENING
Qty: 90 TAB | Refills: 3 | Status: SHIPPED | OUTPATIENT
Start: 2019-02-06 | End: 2020-02-10

## 2019-02-06 RX ORDER — LOSARTAN POTASSIUM 50 MG/1
50 TABLET ORAL DAILY
Qty: 90 TAB | Refills: 3 | Status: SHIPPED | OUTPATIENT
Start: 2019-02-06 | End: 2020-02-10

## 2019-02-06 RX ORDER — EZETIMIBE 10 MG/1
10 TABLET ORAL
Qty: 90 TAB | Refills: 3 | Status: SHIPPED | OUTPATIENT
Start: 2019-02-06 | End: 2020-02-10

## 2019-02-06 NOTE — TELEPHONE ENCOUNTER
Duane R. Scott, Med Ass't  Masha Tang R.N.             I put it as the primary pharmacy for her, It supposedly is through the mail. Zetia, Losartan and Lipitor. I do apologize for any confusion, this is the information given to me per the Pt.    Previous Messages      ----- Message -----   From: Masha Tang R.N.   Sent: 2/6/2019   9:01 AM   To: Duane R. Scott, Med Ass't   Subject: RE: Labs sent to [facility] pharmacy                   I don't see a [facility], do you have address or phone number? And which meds?     Thank You!     ----- Message -----   From: Duane R. Scott, Med Ass't   Sent: 2/6/2019   8:54 AM   To: Masha Tang R.N.   Subject: Labs sent to [facility] pharmacy                       Claudia Flanagan,     Sorry for bugging you, I had a patient who stated that they would like their medications sent to [facility] rather than [facility]. I am not capable of doing that, could you possibly do that when you get the chance?     Thank you!!                 Refill Rx sent per pt's request

## 2019-05-09 ENCOUNTER — TELEPHONE (OUTPATIENT)
Dept: CARDIOLOGY | Facility: MEDICAL CENTER | Age: 69
End: 2019-05-09

## 2019-05-09 DIAGNOSIS — I71.40 AAA (ABDOMINAL AORTIC ANEURYSM) WITHOUT RUPTURE (HCC): ICD-10-CM

## 2019-05-09 NOTE — TELEPHONE ENCOUNTER
May Hein, Med Ass't  Masha Tang R.N.   Phone Number: [phone]             PORSHA     Pt works at [employer] & states they don't do the aorta ultrasounds & needs a retroperitoneal limited ultrasound ordered & faxed to her office.     F# [phone] or [phone]   Ph# [phone]      Called pt, she works at radiology at [facility], they have changed her testing order to US-Retroperitoneal Limited without calling our office first and had the testing completed yesterday. Now she is requesting for a new order for US-Retroperitoneal to be faxed, advise pt that looks like [facility] already sent the result and was already attached to US Aorta ordered by Dr Badillo, unable to placed the new order at this time, will have Dr Badillo review the result and he will advice if the US completed yesterday is not sufficient enough. Advise pt to please call our office next time before changing any order, pt verbalizes understanding.     S/w Emilee-RN manager regarding above.     FYI to Dr Badillo

## 2019-05-15 NOTE — TELEPHONE ENCOUNTER
They performed the correct type of ultrasound. Please let her know her aortic aneurysm is stable and no need for intervention at this time. We continue to follow-up with ultrasounds at the earliest next year.     Thanks!

## 2020-02-10 ENCOUNTER — OFFICE VISIT (OUTPATIENT)
Dept: CARDIOLOGY | Facility: PHYSICIAN GROUP | Age: 70
End: 2020-02-10
Payer: COMMERCIAL

## 2020-02-10 VITALS
SYSTOLIC BLOOD PRESSURE: 128 MMHG | OXYGEN SATURATION: 99 % | HEIGHT: 65 IN | HEART RATE: 86 BPM | DIASTOLIC BLOOD PRESSURE: 74 MMHG | WEIGHT: 153 LBS | BODY MASS INDEX: 25.49 KG/M2

## 2020-02-10 DIAGNOSIS — E78.5 DYSLIPIDEMIA: ICD-10-CM

## 2020-02-10 DIAGNOSIS — I10 ESSENTIAL HYPERTENSION, BENIGN: ICD-10-CM

## 2020-02-10 DIAGNOSIS — E78.00 PURE HYPERCHOLESTEROLEMIA: ICD-10-CM

## 2020-02-10 DIAGNOSIS — I25.10 CORONARY ARTERY DISEASE INVOLVING NATIVE CORONARY ARTERY OF NATIVE HEART WITHOUT ANGINA PECTORIS: ICD-10-CM

## 2020-02-10 DIAGNOSIS — I71.40 AAA (ABDOMINAL AORTIC ANEURYSM) WITHOUT RUPTURE (HCC): ICD-10-CM

## 2020-02-10 PROCEDURE — 99214 OFFICE O/P EST MOD 30 MIN: CPT | Performed by: INTERNAL MEDICINE

## 2020-02-10 RX ORDER — LOSARTAN POTASSIUM 50 MG/1
50 TABLET ORAL DAILY
Qty: 90 TAB | Refills: 3 | Status: SHIPPED | OUTPATIENT
Start: 2020-02-10 | End: 2020-11-12 | Stop reason: SDUPTHER

## 2020-02-10 RX ORDER — ATORVASTATIN CALCIUM 80 MG/1
80 TABLET, FILM COATED ORAL EVERY EVENING
Qty: 90 TAB | Refills: 3 | Status: SHIPPED | OUTPATIENT
Start: 2020-02-10 | End: 2020-11-12 | Stop reason: SDUPTHER

## 2020-02-10 RX ORDER — EZETIMIBE 10 MG/1
10 TABLET ORAL
Qty: 90 TAB | Refills: 3 | Status: SHIPPED | OUTPATIENT
Start: 2020-02-10 | End: 2021-02-25

## 2020-02-10 NOTE — PATIENT INSTRUCTIONS
Please get fasting blood tests in 3 months after starting repatha.     Please stop zetia when you start repatha.

## 2020-02-10 NOTE — PROGRESS NOTES
Cardiology Follow-up Consultation Note    Date of note:    2/10/2020  Primary Care Provider: Luis Woods M.D.  Referring Provider: Sierra Black    Patient Name: Jailyn Griffiths     YOB: 1950  MRN:              [id number]    Chief Complaint: CAD    History of Present Illness: Jailyn Griffiths is a 70 y.o. female whose current medical problems include AAA, CAD s/p PCI 1999, celiac disease, hypertension, and dyslipidemia who is here for follow-up.    At our visit, 8/6/2018:  In terms of hypertension, does not measure BP at home.  At work, typically in the 120s.    In terms of dyslipidemia, poorly controlled, on lipitor 40.  LDL >100. Previously on lipitor 80, however LFTs increased so dose was down to 40.      Does have some leg swelling in left foot, which has improved with NSAIDs. Worse in the morning.     At our visit, 2/4/2019:  Had back surgery.     Interim Events:  In terms of CAD, no angina. Not exercising besides at work.     In terms of hypertension, well controlled.     In terms of dyslipidemia, slightly improved with diet, but inadequately controlled.         ROS  Constitution: Negative for chills, fever and night sweats.   HENT: Negative for nosebleeds.    Eyes: Negative for vision loss in left eye and vision loss in right eye.   Respiratory: Negative for hemoptysis.    Gastrointestinal: Negative for hematemesis, hematochezia and melena.   Genitourinary: Negative for hematuria.   Neurological: Negative for focal weakness, numbness and paresthesias.       Past Medical History:   Diagnosis Date   • AAA (abdominal aortic aneurysm) without rupture (HCC)     Followed by Dr. Martínez   • Abnormal LFTs 09/24/2015    Neg hepatitis panel, possibly due to medication   • CAD (coronary artery disease), native coronary artery 1999    3x18mm Duet stent RCA   • Celiac disease 2012    With protein losing enteropathy and transient apparent congestive failure provoked by that with normal  echocardiogram. Evaluated by gastrointestinal consultants, Dr. Lira   • Dental disorder     dentures [K08.9]   • Dyslipidemia     Elevated HDL and LDL   • Hypertension    • Nummular eczema    • Postsurgical menopause     at 29 yo due to bleeding   • Sleep related leg cramps    • Varicose veins of leg with complications          Past Surgical History:   Procedure Laterality Date   • VEIN LIGATION RADIO FREQUENCY  7/19/2010    Performed by CHARLENE ROSE at [facility]GERY [facility]   • STENT PLACEMENT  1996    RCA done emergently   • GERARD BY LAPAROSCOPY  1996   • APPENDECTOMY     • BREAST BIOPSY      left, normal   • GYN SURGERY  over 30 years ago    hysterectomy   • HYSTERECTOMY, TOTAL ABDOMINAL      with BSO did HRT for 25 yrs now off   • TONSILLECTOMY AND ADENOIDECTOMY           Current Outpatient Medications   Medication Sig Dispense Refill   • atorvastatin (LIPITOR) 80 MG tablet Take 1 Tab by mouth every evening. 90 Tab 3   • losartan (COZAAR) 50 MG Tab Take 1 Tab by mouth every day. 90 Tab 3   • ezetimibe (ZETIA) 10 MG Tab Take 1 Tab by mouth every day. 90 Tab 3   • gabapentin (NEURONTIN) 300 MG Cap Take 1 Cap by mouth 3 times a day. 90 Cap 5   • cyclobenzaprine (FLEXERIL) 5 MG tablet Take 1 Tab by mouth 2 times a day as needed. 30 Tab 5   • Probiotic Product (PROBIOTIC DAILY PO) Take  by mouth.     • clobetasol (TEMOVATE) 0.05 % external solution Apply to psoriasis to scalp twice per day x 2 weeks on then 2 weeks off 1 Bottle 3   • vitamin e (VITAMIN E) 400 UNIT CAPS Take 400 Units by mouth every day.     • aspirin (ASA) 325 MG TABS Take 81 mg by mouth every day.       No current facility-administered medications for this visit.          Allergies   Allergen Reactions   • Nkda [No Known Drug Allergy]          Family History   Problem Relation Age of Onset   • Cancer Mother         breast cancer at age 75   • Diabetes Mother    • Diabetes Father    • Hypertension Father    • Hyperlipidemia Father    • Stroke  Father    • Cancer Maternal Grandfather         colon cancer   • Heart Disease Paternal Grandmother          Social History     Socioeconomic History   • Marital status:      Spouse name: Not on file   • Number of children: Not on file   • Years of education: Not on file   • Highest education level: Not on file   Occupational History   • Not on file   Social Needs   • Financial resource strain: Not on file   • Food insecurity:     Worry: Not on file     Inability: Not on file   • Transportation needs:     Medical: Not on file     Non-medical: Not on file   Tobacco Use   • Smoking status: Former Smoker     Packs/day: 1.50     Years: 30.00     Pack years: 45.00     Types: Cigarettes   • Smokeless tobacco: Never Used   • Tobacco comment: 1 ppd 20 yrs,quit 1999   Substance and Sexual Activity   • Alcohol use: Yes     Alcohol/week: 8.4 oz     Types: 14 Glasses of wine per week     Comment: 2 per day   • Drug use: No   • Sexual activity: Yes     Partners: Male     Birth control/protection: Post-Menopausal     Comment:    Lifestyle   • Physical activity:     Days per week: Not on file     Minutes per session: Not on file   • Stress: Not on file   Relationships   • Social connections:     Talks on phone: Not on file     Gets together: Not on file     Attends Rastafarian service: Not on file     Active member of club or organization: Not on file     Attends meetings of clubs or organizations: Not on file     Relationship status: Not on file   • Intimate partner violence:     Fear of current or ex partner: Not on file     Emotionally abused: Not on file     Physically abused: Not on file     Forced sexual activity: Not on file   Other Topics Concern   •  Service No   • Blood Transfusions No   • Caffeine Concern No   • Occupational Exposure No   • Hobby Hazards No   • Sleep Concern No   • Stress Concern No   • Weight Concern No   • Special Diet No   • Back Care No   • Exercise No   • Bike Helmet No   • Seat  "Belt Yes   • Self-Exams Yes   Social History Narrative    Works in [employer].          Physical Exam:  Ambulatory Vitals  /74 (BP Location: Left arm, Patient Position: Sitting, BP Cuff Size: Adult)   Pulse 86   Ht 1.651 m (5' 5\")   Wt 69.4 kg (153 lb)   SpO2 99%    Oxygen Therapy:  Pulse Oximetry: 99 %  BP Readings from Last 4 Encounters:   02/10/20 128/74   02/04/19 120/78   08/06/18 142/82   11/17/17 148/76       Weight/BMI: Body mass index is 25.46 kg/m².  Wt Readings from Last 4 Encounters:   02/10/20 69.4 kg (153 lb)   02/04/19 66.7 kg (147 lb)   08/06/18 68.9 kg (152 lb)   11/17/17 65.3 kg (144 lb)       General: Well appearing and in no apparent distress  Eyes: nl conjunctiva  ENT: OP clear, normal external appearance of nose and ears  Neck: JVP 3-4 cm H2O, no carotid bruits  Lungs: normal respiratory effort, CTAB  Heart: RRR, no murmurs, no rubs or gallops, no edema bilateral lower extremities. No LV/RV heave on cardiac palpatation. 2+ bilateral radial pulses.  2+ bilateral dp pulses.   Abdomen: soft, non tender, non distended, no masses, normal bowel sounds.  No HSM.  Extremities/MSK: no clubbing, no cyanosis  Neurological: No focal sensory deficits  Psychiatric: Appropriate affect, A/O x 3, intact judgement and insight  Skin: Warm extremities    Exam repeated in full and unchanged except for as noted above.      Lab Data Review:  No results found for: CHOLSTRLTOT, LDL, HDL, TRIGLYCERIDE    No results found for: SODIUM, POTASSIUM, CHLORIDE, CO2, GLUCOSE, BUN, CREATININE, BUNCREATRAT, GLOMRATE  No results found for: ALKPHOSPHAT, ASTSGOT, ALTSGPT, TBILIRUBIN   No results found for: WBC  No components found for: HBGA1C  No components found for: TROPONIN  No components found for: BNP    OSH labs 8/3/2018:  hgb 11.8  plt 246  Creatinine 0.8  Bun 11  Sodium 136  Potassium 4.4  ast 76 (ULNM 41)  ALT 81 (ULNM 46  Alk phos 82  Total bili 0.7      HDL 82    tg 108  TSH " 2.3    OSH labs 2019  LFTs WNL except 53 AST    HD 64    tg 84    OSH labs 2019:  HDL 79      tg 98      Cardiac Imaging and Procedures Review:    EKG dated 2018 : My personal interpretation is NSR, normal EKG.     [facility] (): RCA stenting.     Radiology test Review:  Abdominal US 2018:  4.2cm x 3.9cm x 7.5cm long.  Stable since 2016. Infrarenal.           Medical Decision Makin. Coronary artery disease involving native coronary artery of native heart without angina pectoris  -continue aspirin 81mg PO daily  -continue statin, zetia, given h/o CVD and inadequate control of lipids, will add repatha to cholesterol regimen.   -EF WNL per patient based on imaging 20 years ago    2. AAA (abdominal aortic aneurysm) without rupture (HCC)  Stable  -repeat abdominal ultrasound 2019.     3. Abnormal LFTs  Mildly elevation, mostly resolved.  -check again in 3 months with next labs.     4. Essential hypertension, benign  Well controlled at home.    5. Dyslipidemia  Poorly controlled in setting of CAD.  -repatha as per above  -continue lipitor  -stop zetia when starts repatha  -CMP/lipids in 3 months after starting repatha.         Return in about 1 year (around 2/10/2021).      Sancho Badillo MD, [facility]  [facility], [address]  [address] [address]  Phone: [phone]  Fax: [phone]

## 2020-03-10 ENCOUNTER — TELEPHONE (OUTPATIENT)
Dept: CARDIOLOGY | Facility: MEDICAL CENTER | Age: 70
End: 2020-03-10

## 2020-03-10 NOTE — TELEPHONE ENCOUNTER
PAR sent to plan:  Jailyn Griffiths (Archer: [id number])   Repatha SureClick 140MG/ML auto-injectors   Form  [facility] Medication Request Form   Plan Contact  [phone] phone   [phone] fax   Created   5 minutes ago   Sent to Plan   less than a minute ago   Downloaded   less than a minute ago   Determination   Wait for Determination  Please wait for to return a determination.

## 2020-03-10 NOTE — TELEPHONE ENCOUNTER
PA re-directed:  Jailyn Griffiths ([place]: [id number])   Repatha SureClick 140MG/ML auto-injectors   Form  [employer] Repatha Injectable Medication Precertification Form   Plan Contact  [phone] phone   [phone] fax   Created   3 hours ago   Sent to Plan   1 minute ago   Downloaded   less than a minute ago   Determination   Wait for Determination  Please wait for to return a determination.

## 2020-05-22 ENCOUNTER — TELEPHONE (OUTPATIENT)
Dept: CARDIOLOGY | Facility: MEDICAL CENTER | Age: 70
End: 2020-05-22

## 2020-05-22 NOTE — TELEPHONE ENCOUNTER
Pharmacist called - transferred directly by .  Pharmacist requested diagnosis for Repatha.    Per last office note, Repatha prescribed for CAD.    Pharmacist verbalized understanding and was appreciative for the information.

## 2020-06-23 ENCOUNTER — TELEPHONE (OUTPATIENT)
Dept: CARDIOLOGY | Facility: MEDICAL CENTER | Age: 70
End: 2020-06-23

## 2020-06-26 DIAGNOSIS — E78.5 DYSLIPIDEMIA: Primary | ICD-10-CM

## 2020-06-26 NOTE — TELEPHONE ENCOUNTER
BETI HADLEY ([place]: [id number])  Repatha SureClick 140MG/ML auto-injectors     Form  [employer] Commercial Electronic PA Form  Created  12 minutes ago  Sent to Plan  11 minutes ago  Plan Response  4 minutes ago  Submit Clinical Questions  Determination  N/A  Message from Plan  Your PA has been resolved, no additional PA is required. For further inquiries please contact the number on the back of the member prescription card. (Message [phone])  
Called patient to update with new information. Patient verbalized understanding and was appreciative for the information.  
Called the number provided and was told that the Repatha is currently covered until July 1st, was told to call [phone] to process a PA for July 1st    Called the number and PA pending for Repatha. Case number is [id number]    Preferred therapy as of July 1st is Praluent     
PAR sent to patient's plan    Patient is requesting a call back to explore alternate options      BETI ROTHMANMAYRA ([place]: [id number])  Repatha SureClick 140MG/ML auto-injectors     Form  [employer] Commercial Electronic PA Form  Created  3 minutes ago  Sent to Plan  2 minutes ago  Determination  Wait for Questions  [employer] typically responds with questions in less than 15 minutes, but may take up to 24 hours.  
PORSHA pt returning this call, [phone]  
Patient Call   Afia Busch Med Ass't  You 20 hours ago (3:13 PM)      The message came back stating the PA is not required   So it can mean the pharmacy that it is at might not be preferred.   Patient will need to follow up with plan on preferred pharmacy for therapy    Message text      Called pt, unable to reach.  Left voicemail to return this call at her earliest convenience to discuss findings.    Update relayed to PORSHA's RN to follow up.  
Pt called again waiting for a call back  
Pt called back, she says you have to call her insurance for a pre-certification at [phone].  
Spoke with patient. Recommended that she call her insurance and ask what the requirements are in order for her Repatha to be covered by them.    Patient verbalized understanding and was appreciative for the phone call. She will call us back once she's spoken to her insurance.  
Not indicated for this patient

## 2020-07-13 DIAGNOSIS — I25.10 CORONARY ARTERY DISEASE INVOLVING NATIVE CORONARY ARTERY OF NATIVE HEART WITHOUT ANGINA PECTORIS: ICD-10-CM

## 2020-07-13 DIAGNOSIS — E78.5 DYSLIPIDEMIA: ICD-10-CM

## 2020-07-13 DIAGNOSIS — I10 ESSENTIAL HYPERTENSION, BENIGN: ICD-10-CM

## 2020-07-17 ENCOUNTER — TELEPHONE (OUTPATIENT)
Dept: CARDIOLOGY | Facility: MEDICAL CENTER | Age: 70
End: 2020-07-17

## 2020-07-17 NOTE — LETTER
July 20, 2020        Jailyn Griffiths  [address] [facility] [address]  [place] [address]        Dear Jailyn Wilson:    Dr. Badillo reviewed your lab results, and it showed a drastic reduction in your LDLs; your cholesterol looks excellent! Your liver function and enzymes are also stable.    There will be no changes to your medications or treatment at this time, and please follow-up as planned.    If you have any questions or concerns, please don't hesitate to call.      Sincerely,  Paris SALINAS for Dr. Justino M.D. [facility]  Electronically Signed

## 2020-07-17 NOTE — TELEPHONE ENCOUNTER
Labs reviewed and showed drastic reduction in LDL. Cholesterol looks excellent. No changes at this time. Liver function and transaminases stable. F/u as planned. Please let her know, thanks!

## 2020-07-20 NOTE — TELEPHONE ENCOUNTER
Attempted to reach patient. Left message on machine requesting a return phone call.    2:11 PM = Letter drafted and mailed to address on chart.

## 2020-11-12 ENCOUNTER — OFFICE VISIT (OUTPATIENT)
Dept: CARDIOLOGY | Facility: PHYSICIAN GROUP | Age: 70
End: 2020-11-12
Payer: COMMERCIAL

## 2020-11-12 VITALS
DIASTOLIC BLOOD PRESSURE: 70 MMHG | OXYGEN SATURATION: 96 % | WEIGHT: 145.2 LBS | HEIGHT: 65 IN | BODY MASS INDEX: 24.19 KG/M2 | HEART RATE: 81 BPM | SYSTOLIC BLOOD PRESSURE: 120 MMHG

## 2020-11-12 DIAGNOSIS — I71.40 AAA (ABDOMINAL AORTIC ANEURYSM) WITHOUT RUPTURE (HCC): ICD-10-CM

## 2020-11-12 DIAGNOSIS — I10 ESSENTIAL HYPERTENSION, BENIGN: ICD-10-CM

## 2020-11-12 DIAGNOSIS — I25.10 CORONARY ARTERY DISEASE INVOLVING NATIVE CORONARY ARTERY OF NATIVE HEART WITHOUT ANGINA PECTORIS: ICD-10-CM

## 2020-11-12 DIAGNOSIS — E78.00 PURE HYPERCHOLESTEROLEMIA: ICD-10-CM

## 2020-11-12 DIAGNOSIS — Z95.5 STENTED CORONARY ARTERY: ICD-10-CM

## 2020-11-12 PROCEDURE — 99214 OFFICE O/P EST MOD 30 MIN: CPT | Performed by: NURSE PRACTITIONER

## 2020-11-12 RX ORDER — ATORVASTATIN CALCIUM 80 MG/1
80 TABLET, FILM COATED ORAL EVERY EVENING
Qty: 90 TAB | Refills: 3 | Status: SHIPPED | OUTPATIENT
Start: 2020-11-12 | End: 2021-11-12 | Stop reason: SDUPTHER

## 2020-11-12 RX ORDER — LOSARTAN POTASSIUM 50 MG/1
50 TABLET ORAL DAILY
Qty: 90 TAB | Refills: 3 | Status: SHIPPED | OUTPATIENT
Start: 2020-11-12 | End: 2021-11-09 | Stop reason: SDUPTHER

## 2020-11-12 ASSESSMENT — ENCOUNTER SYMPTOMS
BACK PAIN: 1
COUGH: 0
DIZZINESS: 0
ABDOMINAL PAIN: 0
HEADACHES: 0
PND: 0
PALPITATIONS: 0
CHILLS: 0
NAUSEA: 0
MYALGIAS: 0
BRUISES/BLEEDS EASILY: 0
LOSS OF CONSCIOUSNESS: 0
FEVER: 0
INSOMNIA: 0
SHORTNESS OF BREATH: 0
ORTHOPNEA: 0

## 2020-11-12 NOTE — PROGRESS NOTES
Chief Complaint   Patient presents with   • Follow-Up   • Coronary Artery Disease   • HTN (Controlled)   • Hyperlipidemia   • Abdominal Aortic Aneurysm       Subjective:   Jailyn Griffiths is a 70 y.o. female who presents today for cardiac pre-operative clearance for back surgery.    Jailyn is a 70 year old female with history of CAD, status post remote PCI/stent to the RCA in 1999, hypertension, hyperlipidemia (treated with statin and PCSK9 inhibitor) and AAA, followed by vascular surgery, normally followed by Dr. Badillo.    She is scheduled to have low back surgery on 12/15/2020 by Dr. Diaz at [facility].    From a cardiac standpoint, she is doing well: no chest pain, pressure or discomfort; no palpitations; no shortness of breath, orthopnea or PND; no dizziness or syncope; no LE edema. BP is stable. She tolerates Repatha well.    Past Medical History:   Diagnosis Date   • AAA (abdominal aortic aneurysm) without rupture (HCC) 05/2019    Abdominal US with AA 4.2cm transverse x 3.9 AP x 7.7cm longitudinally, followed by Dr. Martínez.   • Abnormal LFTs     Neg hepatitis panel, possibly due to medication   • CAD (coronary artery disease), native coronary artery 1999    PCI/stent (Duet 3.0 x 18mm) the RCA   • Celiac disease 2012    With protein losing enteropathy and transient apparent congestive failure provoked by that with normal echocardiogram. Evaluated by gastrointestinal consultants, Dr. Lira   • Dental disorder     Dentures   • Dyslipidemia     Elevated HDL and LDL   • Hypertension    • Nummular eczema    • Postsurgical menopause     At 27 yo due to bleeding   • Sleep related leg cramps    • Varicose veins of leg with complications      Past Surgical History:   Procedure Laterality Date   • VEIN LIGATION RADIO FREQUENCY  7/19/2010    Performed by CHARLENE ROSE at SURGERY [facility] ORS   • STENT PLACEMENT  1996    RCA done emergently   • GERARD BY LAPAROSCOPY  1996   • APPENDECTOMY     • BREAST BIOPSY      left,  normal   • GYN SURGERY  over 30 years ago    hysterectomy   • HYSTERECTOMY, TOTAL ABDOMINAL      with BSO did HRT for 25 yrs now off   • TONSILLECTOMY AND ADENOIDECTOMY       Family History   Problem Relation Age of Onset   • Cancer Mother         breast cancer at age 75   • Diabetes Mother    • Diabetes Father    • Hypertension Father    • Hyperlipidemia Father    • Stroke Father    • Cancer Maternal Grandfather         colon cancer   • Heart Disease Paternal Grandmother      Social History     Socioeconomic History   • Marital status:      Spouse name: Not on file   • Number of children: Not on file   • Years of education: Not on file   • Highest education level: Not on file   Occupational History   • Not on file   Social Needs   • Financial resource strain: Not on file   • Food insecurity     Worry: Not on file     Inability: Not on file   • Transportation needs     Medical: Not on file     Non-medical: Not on file   Tobacco Use   • Smoking status: Former Smoker     Packs/day: 1.50     Years: 30.00     Pack years: 45.00     Types: Cigarettes   • Smokeless tobacco: Never Used   • Tobacco comment: 1 ppd 20 yrs,quit 1999   Substance and Sexual Activity   • Alcohol use: Yes     Alcohol/week: 8.4 oz     Types: 14 Glasses of wine per week     Comment: 2 per day   • Drug use: No   • Sexual activity: Yes     Partners: Male     Birth control/protection: Post-Menopausal     Comment:    Lifestyle   • Physical activity     Days per week: Not on file     Minutes per session: Not on file   • Stress: Not on file   Relationships   • Social connections     Talks on phone: Not on file     Gets together: Not on file     Attends Latter-day service: Not on file     Active member of club or organization: Not on file     Attends meetings of clubs or organizations: Not on file     Relationship status: Not on file   • Intimate partner violence     Fear of current or ex partner: Not on file     Emotionally abused: Not on file      Physically abused: Not on file     Forced sexual activity: Not on file   Other Topics Concern   •  Service No   • Blood Transfusions No   • Caffeine Concern No   • Occupational Exposure No   • Hobby Hazards No   • Sleep Concern No   • Stress Concern No   • Weight Concern No   • Special Diet No   • Back Care No   • Exercise No   • Bike Helmet No   • Seat Belt Yes   • Self-Exams Yes   Social History Narrative    Works in [employer].      Allergies   Allergen Reactions   • Nkda [No Known Drug Allergy]      Outpatient Encounter Medications as of 11/12/2020   Medication Sig Dispense Refill   • losartan (COZAAR) 50 MG Tab Take 1 Tab by mouth every day. 90 Tab 3   • atorvastatin (LIPITOR) 80 MG tablet Take 1 Tab by mouth every evening. 90 Tab 3   • Evolocumab, REPATHA, 140 MG/ML Solution Auto-injector Inject 1 Each as instructed every 14 days. 2 PEN 0   • Probiotic Product (PROBIOTIC DAILY PO) Take  by mouth.     • clobetasol (TEMOVATE) 0.05 % external solution Apply to psoriasis to scalp twice per day x 2 weeks on then 2 weeks off 1 Bottle 3   • vitamin e (VITAMIN E) 400 UNIT CAPS Take 400 Units by mouth every day.     • aspirin (ASA) 325 MG TABS Take 81 mg by mouth every day.     • ezetimibe (ZETIA) 10 MG Tab Take 1 Tab by mouth every day. (Patient not taking: Reported on 11/12/2020) 90 Tab 3   • [DISCONTINUED] losartan (COZAAR) 50 MG Tab Take 1 Tab by mouth every day. 90 Tab 3   • [DISCONTINUED] atorvastatin (LIPITOR) 80 MG tablet Take 1 Tab by mouth every evening. 90 Tab 3   • [DISCONTINUED] gabapentin (NEURONTIN) 300 MG Cap Take 1 Cap by mouth 3 times a day. (Patient not taking: Reported on 11/12/2020) 90 Cap 5   • [DISCONTINUED] cyclobenzaprine (FLEXERIL) 5 MG tablet Take 1 Tab by mouth 2 times a day as needed. (Patient not taking: Reported on 11/12/2020) 30 Tab 5     No facility-administered encounter medications on file as of 11/12/2020.      Review of Systems   Constitutional: Negative  "for chills and fever.   HENT: Negative for congestion.    Respiratory: Negative for cough and shortness of breath.    Cardiovascular: Negative for chest pain, palpitations, orthopnea, leg swelling and PND.   Gastrointestinal: Negative for abdominal pain and nausea.   Musculoskeletal: Positive for back pain. Negative for myalgias.   Skin: Negative for rash.   Neurological: Negative for dizziness, loss of consciousness and headaches.   Endo/Heme/Allergies: Does not bruise/bleed easily.   Psychiatric/Behavioral: The patient does not have insomnia.         Objective:   /70 (BP Location: Left arm, Patient Position: Sitting, BP Cuff Size: Adult)   Pulse 81   Ht 1.651 m (5' 5\")   Wt 65.9 kg (145 lb 3.2 oz)   SpO2 96%   BMI 24.16 kg/m²     Physical Exam   Constitutional: She is oriented to person, place, and time. She appears well-developed and well-nourished.   HENT:   Head: Normocephalic.   Eyes: EOM are normal.   Neck: Normal range of motion. Neck supple. No JVD present.   Cardiovascular: Normal rate and regular rhythm.   Murmur heard.   Systolic murmur is present with a grade of 2/6.  Murmur at RUSB heard.   Pulmonary/Chest: Effort normal and breath sounds normal. No respiratory distress. She has no wheezes. She has no rales.   Abdominal: Soft. Bowel sounds are normal. She exhibits no distension. There is no abdominal tenderness.   Musculoskeletal: Normal range of motion.         General: No edema.   Neurological: She is alert and oriented to person, place, and time.   Skin: Skin is warm and dry. No rash noted.   Psychiatric: She has a normal mood and affect.     LABS AS OF 7/9/2020:  Glucose 77  BUN 15  Creatinine 0.93  GFR 63  Potassium 4.4  AST 65  ALT 51  Cholesterol 156  Triglycerides 74    LDL 40  Cholesterol/HDL ratio 1.5    RESULTS OF ABDOMINAL US OF 5/8/2019:  AAA measuring 4.2cm transverse x 3.0cm AP x 7.70cm longitudinally.    Assessment:     1. Coronary artery disease involving native " coronary artery of native heart without angina pectoris  EC-ECHOCARDIOGRAM COMPLETE W/O CONT   2. Stented right coronary artery     3. Pure hypercholesterolemia  atorvastatin (LIPITOR) 80 MG tablet   4. Essential hypertension, benign  EC-ECHOCARDIOGRAM COMPLETE W/O CONT    losartan (COZAAR) 50 MG Tab   5. AAA (abdominal aortic aneurysm) without rupture (HCC)  US-ABDOMINAL AORTA W/O DOPPLER    US-ABDOMINAL AORTA W/O DOPPLER       Medical Decision Making:  Today's Assessment / Status / Plan:     1. CAD, status post remote PCI/stent to the RCA in 1999, on ASA, ARB and statin/Repatha. Given murmur heard today, will check echocardiogram.    2. Hyperlipidemia, treated with statin/Repatha, with LDL at 40. She is tolerating both well. LFTs are stable.    3. Hypertension, treated with Losartan. BP is good.    4. AAA, stable on abdominal US. Due to have rechecked.    Same medications for now. Echo and abdominal US as above. Keep February 2021 FU with Dr. Badillo.

## 2020-11-30 ENCOUNTER — TELEPHONE (OUTPATIENT)
Dept: CARDIOLOGY | Facility: MEDICAL CENTER | Age: 70
End: 2020-11-30

## 2020-11-30 DIAGNOSIS — I25.10 CORONARY ARTERY DISEASE INVOLVING NATIVE CORONARY ARTERY OF NATIVE HEART WITHOUT ANGINA PECTORIS: ICD-10-CM

## 2020-11-30 DIAGNOSIS — I10 ESSENTIAL HYPERTENSION, BENIGN: ICD-10-CM

## 2020-12-02 ENCOUNTER — TELEPHONE (OUTPATIENT)
Dept: CARDIOLOGY | Facility: MEDICAL CENTER | Age: 70
End: 2020-12-02

## 2020-12-02 NOTE — TELEPHONE ENCOUNTER
AB    AW  TO: 2:30pm/Tu/Ofc  NM: Isabela lewis/ [facility]   PH: [phone]   PT NM: Jailyn Griffiths   : 54   REG DR: Dr Badillo   RE: Requesting clearance for surgery  scheduled 12/15   DISP HIST: 2020 02:14P NR p/disp  2020 02:16P DAF checked

## 2020-12-03 NOTE — TELEPHONE ENCOUNTER
Left message w/ [facility] for Isabela. Informed her we need clearance request faxed over, fax number provided.

## 2020-12-08 NOTE — TELEPHONE ENCOUNTER
Left message for Isabela that Amt will sign clearance form today for surgery on 12-15-20.  To Tabitha. Clearance form faxed to [facility]. 11-30-20 echo is normal.

## 2020-12-08 NOTE — TELEPHONE ENCOUNTER
"Isabela from [facility] called asking about a clearance. Pts chart shows the surgical request is in media \"11/5/2020 -  CARDIOLOGY REFERRAL/ SURGICAL CLEARANCE\" Isabela is requesting a call back at [phone]    Thank you  "

## 2020-12-11 ENCOUNTER — TELEPHONE (OUTPATIENT)
Dept: CARDIOLOGY | Facility: MEDICAL CENTER | Age: 70
End: 2020-12-11

## 2020-12-11 NOTE — LETTER
PROCEDURE/SURGERY CLEARANCE FORM      Encounter Date: 12/14/2020    Patient: Jailyn Griffiths  YOB: 1950    CARDIOLOGIST:  Sancho Badillo M.D.    REFERRING DOCTOR:  Dr. Andrew       Patient is moderate risk of cardiovascular complications for low to moderate risk surgery or procedures.  She is medically optimized based on my last in person assessment, and if her symptoms have not changed, surgery should proceed without further cardiac work-up.  Aspirin should be continued throughout the surgical period if possible due to her known history of coronary artery disease,  however if surgically necessary, this medication is ok to stop prior to surgery.      Sancho Badillo M.D.  Electronic Signature

## 2020-12-11 NOTE — TELEPHONE ENCOUNTER
Patient is moderate risk of cardiovascular complications for low to moderate risk surgery or procedures.  She is medically optimized based on my last in person assessment, and if her symptoms have not changed, surgery should proceed without further cardiac work-up.  Aspirin should be continued throughout the surgical period if possible due to her known history of coronary artery disease,  however if surgically necessary, this medication is ok to stop prior to surgery.     I have forward this message to Dr. Andrew, please let [facility] know as her surgery is scheduled early next week. Thanks!

## 2020-12-14 NOTE — TELEPHONE ENCOUNTER
Received surgical clearance for pt.  Letter made and faxed to [facility] at F: [phone], P: [phone].     Fax line busy will try again later.

## 2021-01-19 ENCOUNTER — TELEPHONE (OUTPATIENT)
Dept: CARDIOLOGY | Facility: MEDICAL CENTER | Age: 71
End: 2021-01-19

## 2021-01-19 DIAGNOSIS — E78.5 DYSLIPIDEMIA: ICD-10-CM

## 2021-01-19 DIAGNOSIS — R79.89 ABNORMAL LFTS: ICD-10-CM

## 2021-01-19 NOTE — TELEPHONE ENCOUNTER
AB    NM: Angela lewis/  [facility]   PH: [phone]   PT NM: Jailyn Griffiths   : 54   REG DR: Dr Badillo   RE: Patient is currently on Repatha,  and insurance is requesting that she  be switched over to Praluent.  Following up on request that was sent  to the Office but has not been  responded to.   DISP HIST: 2021 03:28P [facility]  P/disp    --------------------------------------  Message History  Account: [id number]  Taken:  2021  3:28p [facility]  Serial#: [email or web address]      Thank you,  Tabitha DAMON

## 2021-01-20 RX ORDER — ALIROCUMAB 75 MG/ML
75 INJECTION, SOLUTION SUBCUTANEOUS
Qty: 1.96 ML | Refills: 11 | Status: SHIPPED | OUTPATIENT
Start: 2021-01-20 | End: 2021-12-30

## 2021-01-20 NOTE — TELEPHONE ENCOUNTER
TONI Gray 15 minutes ago (11:16 AM)        Yes, OK to switch from Repatha to Praluent.  Start with 75mg SC every 2 weeks.  Repeat CMP and lipid panel in 4-6 weeks.  Thanks, AB        Medication ordered and sent to [facility], lab orders placed and put in mail.    Pt notified

## 2021-01-20 NOTE — TELEPHONE ENCOUNTER
Spoke with Rhonda at [facility]. Repatha is no longer covered by pt's. Insurance; Praluent is covered.  To Tabitha: okay to switch? What dose?

## 2021-01-20 NOTE — TELEPHONE ENCOUNTER
Yes, OK to switch from Repatha to Praluent.  Start with 75mg SC every 2 weeks.  Repeat CMP and lipid panel in 4-6 weeks.  Thanks, AB

## 2021-01-21 NOTE — TELEPHONE ENCOUNTER
NM: Sheree    HOSP: [facility]    PH: [phone]   ALT PH: NO   PT NM: Jailyn Griffiths   : 1950   REG DR: Dr Badillo   RE: Insurance is wanting to switch  RX's from Repatha to Praluent   DISP HIST: 2021 12:44P SM p/disp  2021 12:52P [facility] chkd    --------------------------------------  Message History  Account: [id number]  Taken:  Thu 2021 12:44p   Serial#: [id number]      Thank you,  Tabitha DAMON

## 2021-01-26 ENCOUNTER — TELEPHONE (OUTPATIENT)
Dept: CARDIOLOGY | Facility: MEDICAL CENTER | Age: 71
End: 2021-01-26

## 2021-01-26 NOTE — TELEPHONE ENCOUNTER
AB    NM: Govind   HOSP: [facility]   PH: [phone]   PT NM: Jailyn Griffiths   : 54   REG DR: Tabitha Lamb   RE: Needs diagnosis code for RX.   DISP HIST: 2021 01:57P AF p/disp  2021 01:59P  checked    --------------------------------------  Message History  Account: [id number]  Taken:  2021  1:57p AF  Serial#: [email or web address]      Thank you,  Tabitha DAMON

## 2021-02-09 ENCOUNTER — TELEPHONE (OUTPATIENT)
Dept: CARDIOLOGY | Facility: MEDICAL CENTER | Age: 71
End: 2021-02-09

## 2021-02-09 DIAGNOSIS — E78.5 DYSLIPIDEMIA: ICD-10-CM

## 2021-02-09 DIAGNOSIS — R79.89 ABNORMAL LFTS: ICD-10-CM

## 2021-02-09 NOTE — TELEPHONE ENCOUNTER
----- Message from TONI Gray sent at 2/9/2021 11:02 AM PST -----  Labs are all good. LDL is 54, which is at goal. Same meds, and keep 2/25/21 FU with PORSHA.  Thanks, Tabitha

## 2021-02-18 ENCOUNTER — TELEPHONE (OUTPATIENT)
Dept: CARDIOLOGY | Facility: MEDICAL CENTER | Age: 71
End: 2021-02-18

## 2021-02-18 NOTE — TELEPHONE ENCOUNTER
Received PA request for Repatha however patient has Rx's in chart for Praluent and Repatha    To Mirtha Charles RN for AB

## 2021-02-25 ENCOUNTER — OFFICE VISIT (OUTPATIENT)
Dept: CARDIOLOGY | Facility: PHYSICIAN GROUP | Age: 71
End: 2021-02-25
Payer: COMMERCIAL

## 2021-02-25 VITALS
DIASTOLIC BLOOD PRESSURE: 66 MMHG | OXYGEN SATURATION: 100 % | WEIGHT: 145 LBS | HEIGHT: 65 IN | SYSTOLIC BLOOD PRESSURE: 130 MMHG | BODY MASS INDEX: 24.16 KG/M2 | HEART RATE: 80 BPM

## 2021-02-25 DIAGNOSIS — Z95.5 STENTED CORONARY ARTERY: ICD-10-CM

## 2021-02-25 DIAGNOSIS — E78.5 DYSLIPIDEMIA: ICD-10-CM

## 2021-02-25 DIAGNOSIS — K90.49 PROTEIN LOSING ENTEROPATHY: ICD-10-CM

## 2021-02-25 DIAGNOSIS — I71.40 AAA (ABDOMINAL AORTIC ANEURYSM) WITHOUT RUPTURE (HCC): ICD-10-CM

## 2021-02-25 DIAGNOSIS — I25.10 CORONARY ARTERY DISEASE INVOLVING NATIVE CORONARY ARTERY OF NATIVE HEART WITHOUT ANGINA PECTORIS: ICD-10-CM

## 2021-02-25 DIAGNOSIS — I10 ESSENTIAL HYPERTENSION, BENIGN: ICD-10-CM

## 2021-02-25 DIAGNOSIS — R79.89 ABNORMAL LFTS: ICD-10-CM

## 2021-02-25 PROCEDURE — 99213 OFFICE O/P EST LOW 20 MIN: CPT | Performed by: INTERNAL MEDICINE

## 2021-02-25 RX ORDER — ONDANSETRON 4 MG/1
4 TABLET, FILM COATED ORAL
COMMUNITY
Start: 2021-01-06 | End: 2022-02-24

## 2021-02-25 RX ORDER — ONDANSETRON 4 MG/1
TABLET, ORALLY DISINTEGRATING ORAL
COMMUNITY
Start: 2021-01-08 | End: 2021-02-25

## 2021-02-25 RX ORDER — CYCLOBENZAPRINE HCL 10 MG
10 TABLET ORAL 3 TIMES DAILY
COMMUNITY
Start: 2020-12-18 | End: 2021-02-25

## 2021-02-25 RX ORDER — METOCLOPRAMIDE 10 MG/1
TABLET ORAL
COMMUNITY
Start: 2021-01-02 | End: 2022-02-24

## 2021-02-25 RX ORDER — OXYCODONE HYDROCHLORIDE 10 MG/1
TABLET ORAL
COMMUNITY
Start: 2020-12-24 | End: 2021-02-25

## 2021-02-25 NOTE — PROGRESS NOTES
Cardiology Follow-up Consultation Note    Date of note:    2/25/2021  Primary Care Provider: Luis Woods M.D.  Referring Provider: Sierra Black    Patient Name: Jailyn Griffiths     YOB: 1950  MRN:              [id number]    Chief Complaint: CAD    History of Present Illness: Jailyn Griffiths is a 71 y.o. female whose current medical problems include AAA, CAD s/p PCI 1999, celiac disease, hypertension, and dyslipidemia who is here for follow-up.    At our visit, 8/6/2018:  In terms of hypertension, does not measure BP at home.  At work, typically in the 120s.    In terms of dyslipidemia, poorly controlled, on lipitor 40.  LDL >100. Previously on lipitor 80, however LFTs increased so dose was down to 40.      Does have some leg swelling in left foot, which has improved with NSAIDs. Worse in the morning.     At our visit, 2/4/2019:  Had back surgery.     At our visit, 2/10/2020:  In terms of CAD, no angina. Not exercising besides at work.     In terms of dyslipidemia, slightly improved with diet, but inadequately controlled.     Interim Events:  In terms of CAD, no angina.     In terms of hypertension, well controlled.     Dyslipidemia now well controlled on PCSK9.     Still no exercise.       ROS  Constitution: Negative for chills, fever and night sweats.   HENT: Negative for nosebleeds.    Eyes: Negative for vision loss in left eye and vision loss in right eye.   Respiratory: Negative for hemoptysis.    Gastrointestinal: Negative for hematemesis, hematochezia and melena.   Genitourinary: Negative for hematuria.   Neurological: Negative for focal weakness, numbness and paresthesias.     All other systems reviewed and discussed using a comprehensive questionnaire and are negative.       Past Medical History:   Diagnosis Date   • AAA (abdominal aortic aneurysm) without rupture (HCC) 11/2020    Abdominal US with AAA 3.9 x 3.9cm.   • Abnormal LFTs     Neg hepatitis panel, possibly due to  medication   • CAD (coronary artery disease), native coronary artery 1999    PCI/stent (Duet 3.0 x 18mm) the RCA   • Celiac disease 2012    With protein losing enteropathy and transient apparent congestive failure provoked by that with normal echocardiogram. Evaluated by gastrointestinal consultants, Dr. Lira   • Dental disorder     Dentures   • Dyslipidemia     Elevated HDL and LDL   • Hypertension    • Nummular eczema    • Postsurgical menopause     At 27 yo due to bleeding   • Sleep related leg cramps    • Varicose veins of leg with complications          Past Surgical History:   Procedure Laterality Date   • VEIN LIGATION RADIO FREQUENCY  7/19/2010    Performed by CHARLENE ROSE at SURGERY [facility]   • STENT PLACEMENT  1996    RCA done emergently   • GERARD BY LAPAROSCOPY  1996   • APPENDECTOMY     • BREAST BIOPSY      left, normal   • GYN SURGERY  over 30 years ago    hysterectomy   • HYSTERECTOMY, TOTAL ABDOMINAL      with BSO did HRT for 25 yrs now off   • TONSILLECTOMY AND ADENOIDECTOMY           Current Outpatient Medications   Medication Sig Dispense Refill   • metoclopramide (REGLAN) 10 MG Tab TAKE 1 TABLET BY MOUTH 4 TIMES DAILY FOR 7 DAYS     • ondansetron (ZOFRAN) 4 MG Tab tablet Take 4 mg by mouth.     • Alirocumab (PRALUENT) 75 MG/ML Solution Auto-injector Inject 75 mg under the skin every 14 days. 1.96 mL 11   • losartan (COZAAR) 50 MG Tab Take 1 Tab by mouth every day. 90 Tab 3   • atorvastatin (LIPITOR) 80 MG tablet Take 1 Tab by mouth every evening. 90 Tab 3   • clobetasol (TEMOVATE) 0.05 % external solution Apply to psoriasis to scalp twice per day x 2 weeks on then 2 weeks off 1 Bottle 3   • vitamin e (VITAMIN E) 400 UNIT CAPS Take 400 Units by mouth every day.     • aspirin (ASA) 325 MG TABS Take 81 mg by mouth every day.     • ezetimibe (ZETIA) 10 MG Tab Take 1 Tab by mouth every day. (Patient not taking: Reported on 11/12/2020) 90 Tab 3     No current facility-administered  medications for this visit.         Allergies   Allergen Reactions   • Nkda [No Known Drug Allergy]          Family History   Problem Relation Age of Onset   • Cancer Mother         breast cancer at age 75   • Diabetes Mother    • Diabetes Father    • Hypertension Father    • Hyperlipidemia Father    • Stroke Father    • Cancer Maternal Grandfather         colon cancer   • Heart Disease Paternal Grandmother          Social History     Socioeconomic History   • Marital status:      Spouse name: Not on file   • Number of children: Not on file   • Years of education: Not on file   • Highest education level: Not on file   Occupational History   • Not on file   Tobacco Use   • Smoking status: Former Smoker     Packs/day: 1.50     Years: 30.00     Pack years: 45.00     Types: Cigarettes   • Smokeless tobacco: Never Used   • Tobacco comment: 1 ppd 20 yrs,quit 1999   Substance and Sexual Activity   • Alcohol use: Yes     Alcohol/week: 8.4 oz     Types: 14 Glasses of wine per week     Comment: 2 per day   • Drug use: No   • Sexual activity: Yes     Partners: Male     Birth control/protection: Post-Menopausal     Comment:    Other Topics Concern   •  Service No   • Blood Transfusions No   • Caffeine Concern No   • Occupational Exposure No   • Hobby Hazards No   • Sleep Concern No   • Stress Concern No   • Weight Concern No   • Special Diet No   • Back Care No   • Exercise No   • Bike Helmet No   • Seat Belt Yes   • Self-Exams Yes   Social History Narrative    Works in [employer].      Social Determinants of Health     Financial Resource Strain:    • Difficulty of Paying Living Expenses:    Food Insecurity:    • Worried About Running Out of Food in the Last Year:    • Ran Out of Food in the Last Year:    Transportation Needs:    • Lack of Transportation (Medical):    • Lack of Transportation (Non-Medical):    Physical Activity:    • Days of Exercise per Week:    • Minutes of Exercise per  "Session:    Stress:    • Feeling of Stress :    Social Connections:    • Frequency of Communication with Friends and Family:    • Frequency of Social Gatherings with Friends and Family:    • Attends Mandaeism Services:    • Active Member of Clubs or Organizations:    • Attends Club or Organization Meetings:    • Marital Status:    Intimate Partner Violence:    • Fear of Current or Ex-Partner:    • Emotionally Abused:    • Physically Abused:    • Sexually Abused:          Physical Exam:  Ambulatory Vitals  /66 (BP Location: Left arm, Patient Position: Sitting, BP Cuff Size: Adult)   Pulse 80   Ht 1.651 m (5' 5\")   Wt 65.8 kg (145 lb)   SpO2 100%    Oxygen Therapy:  Pulse Oximetry: 100 %  BP Readings from Last 4 Encounters:   02/25/21 130/66   11/12/20 120/70   02/10/20 128/74   02/04/19 120/78       Weight/BMI: Body mass index is 24.13 kg/m².  Wt Readings from Last 4 Encounters:   02/25/21 65.8 kg (145 lb)   11/12/20 65.9 kg (145 lb 3.2 oz)   02/10/20 69.4 kg (153 lb)   02/04/19 66.7 kg (147 lb)       General: Well appearing and in no apparent distress  Eyes: nl conjunctiva  ENT: OP covered by mask  Neck: JVP <8 cm H2O, no carotid bruits  Lungs: normal respiratory effort, CTAB  Heart: RRR, no murmurs, no rubs or gallops, no edema bilateral lower extremities. No LV/RV heave on cardiac palpatation. 2+ bilateral radial pulses.   Abdomen: soft, non tender, non distended, no masses, normal bowel sounds.  No HSM.  Extremities/MSK: no clubbing, no cyanosis  Neurological: No focal sensory deficits  Psychiatric: Appropriate affect, A/O x 3, intact judgement and insight  Skin: Warm extremities    Exam repeated in full and unchanged except for as noted above.      Lab Data Review:  No results found for: CHOLSTRLTOT, LDL, HDL, TRIGLYCERIDE    No results found for: SODIUM, POTASSIUM, CHLORIDE, CO2, GLUCOSE, BUN, CREATININE, BUNCREATRAT, GLOMRATE  No results found for: ALKPHOSPHAT, ASTSGOT, ALTSGPT, TBILIRUBIN   No " results found for: WBC  No components found for: HBGA1C  No components found for: TROPONIN  No results found for: BNP    OSH labs 8/3/2018:  hgb 11.8  plt 246  Creatinine 0.8  Bun 11  Sodium 136  Potassium 4.4  ast 76 (ULNM 41)  ALT 81 (ULNM 46  Alk phos 82  Total bili 0.7      HDL 82    tg 108  TSH 2.3    OSH labs 2019  LFTs WNL except 53 AST    HD 64    tg 84    OSH labs 2019:  HDL 79      tg 98    OSH labs 2021:    HDL 85  LDL 54  tg 86  BUN 14  Creatinine 1  Sodium 137  Potassium 4.2  LFTs WNL        Cardiac Imaging and Procedures Review:    EKG dated 2018 : My personal interpretation is NSR, normal EKG.       Echo 2020 [facility]:  LVEF 60%, nl RV, PASP 20, RAP 3, no significant valvular disease.       [facility] (): RCA stenting.     Radiology test Review:  Abdominal US 2018:  4.2cm x 3.9cm x 7.5cm long.  Stable since 2016. Infrarenal.     Abdominal US 2020  3.9cm aneurysm, stable.         Medical Decision Makin. Coronary artery disease involving native coronary artery of native heart without angina pectoris  -continue aspirin 81mg PO daily  -continue statin  -continue praluent 75mg SC Q2 weeks.   -EF WNL per patient based on imaging 20 years ago    2. AAA (abdominal aortic aneurysm) without rupture (HCC)  Stable  -repeat abdominal ultrasound 2022, has been stable since .     3. Essential hypertension, benign  Well controlled at home.    4. Dyslipidemia  Now well controlled  -CMP, lipid panel, hgba1c in 6 months.         Return in about 1 year (around 2022).      Sancho Badillo MD, [facility]  [facility], [facility]  [address] [address]  [place] [address]  Phone: [phone]  Fax: [phone]

## 2021-07-21 ENCOUNTER — TELEPHONE (OUTPATIENT)
Dept: CARDIOLOGY | Facility: MEDICAL CENTER | Age: 71
End: 2021-07-21

## 2021-07-21 NOTE — TELEPHONE ENCOUNTER
JM    Patient called and her Alirocumab (PRALUENT) 75 MG/ML Solution Auto-injector needs a PA. Please submit. She is due for an injection on Monday and is out of this medication.    Thank you,    Sonia SANDERS

## 2021-07-21 NOTE — TELEPHONE ENCOUNTER
BETI HADLEY (Key: [id number])Need help? Call us at [phone]  Status  Sent to [employer]  Next Steps  The plan will fax you a determination, typically within 1 to 5 business days.    How do I follow up?  Drug  Praluent 75MG/ML auto-injectors  Form  [employer] Commercial Specialty Medication Precertification Request Form  Precertification Request for Specialty Drugs for Commercial Members  [phone]  [phone]

## 2021-07-22 NOTE — TELEPHONE ENCOUNTER
Called patient and let her know we submitted the PA but it does take 1-5 business days for a response.  Answered all questions and concerns, appreciative of call.

## 2021-07-28 NOTE — TELEPHONE ENCOUNTER
BETI HADLEY Key: [id number] - PA Case ID: [id number] help? Call us at [phone]  Outcome  Approvedtoday  Your PA request has been approved. Additional information will be provided in the approval communication. (Message 1145)  Drug  Praluent 75MG/ML auto-injectors  Form  [facility] Electronic PA Form (2017 NCPDP)

## 2021-08-02 ENCOUNTER — TELEPHONE (OUTPATIENT)
Dept: MEDICAL GROUP | Facility: PHYSICIAN GROUP | Age: 71
End: 2021-08-02

## 2021-08-02 NOTE — TELEPHONE ENCOUNTER
FINAL PRIOR AUTHORIZATION STATUS:    1.  Name of Medication & Dose: PRALUENT 75MG/ML AUTO INJECTOR     2. Prior Auth Status: Approved through unknown - waiting for insurance to fax approval    3. Action Taken: Pharmacy Notified: yes Patient Notified: yes      Per Cover My Meds- Key [id number]- Medication approved.

## 2021-09-07 ENCOUNTER — TELEPHONE (OUTPATIENT)
Dept: CARDIOLOGY | Facility: MEDICAL CENTER | Age: 71
End: 2021-09-07

## 2021-09-07 NOTE — TELEPHONE ENCOUNTER
PORSHA Fox called from [facility] in [place] to get DX coded for the Pt A1 to be drawn and approved by the insurance. They can be reached at [phone]. Okay to leave detailed message. Okay to give information to whom answers.     Thank you,  Tabitha DAMON

## 2021-09-09 NOTE — TELEPHONE ENCOUNTER
"Upon chart review, last lab work shows elevated fasting glucose, ICD 10 code is R73.01.     Called and spoke to Kate at [facility] and she transferred me to [place].  Confirmed patient and gave ICD 10 code. She stated \" I will get that going\". Answered all questions and concerns, appreciative of return call.   "

## 2021-11-09 DIAGNOSIS — I10 ESSENTIAL HYPERTENSION, BENIGN: ICD-10-CM

## 2021-11-11 RX ORDER — LOSARTAN POTASSIUM 50 MG/1
50 TABLET ORAL DAILY
Qty: 90 TABLET | Refills: 0 | Status: SHIPPED | OUTPATIENT
Start: 2021-11-11 | End: 2022-02-03

## 2021-11-12 ENCOUNTER — TELEPHONE (OUTPATIENT)
Dept: SCHEDULING | Facility: IMAGING CENTER | Age: 71
End: 2021-11-12

## 2021-11-12 DIAGNOSIS — E78.00 PURE HYPERCHOLESTEROLEMIA: ICD-10-CM

## 2021-11-12 RX ORDER — ATORVASTATIN CALCIUM 80 MG/1
80 TABLET, FILM COATED ORAL EVERY EVENING
Qty: 90 TABLET | Refills: 0 | Status: SHIPPED | OUTPATIENT
Start: 2021-11-12 | End: 2022-02-04

## 2021-11-12 NOTE — TELEPHONE ENCOUNTER
Upon chart review, patient to continue medication.     RX refilled, see refill encounter.   Routed to schedulers for [facility].

## 2021-11-12 NOTE — TELEPHONE ENCOUNTER
PORSHA Mabry from [facility] is calling needing authorization to fill  atorvastatin (LIPITOR) 80 MG tablet    Please reach out. Ph # [phone] or fax# [phone]    Thank you,  Katelyn COREY

## 2021-12-30 DIAGNOSIS — E78.00 PURE HYPERCHOLESTEROLEMIA: ICD-10-CM

## 2021-12-30 RX ORDER — ALIROCUMAB 75 MG/ML
INJECTION, SOLUTION SUBCUTANEOUS
Qty: 6 EACH | Refills: 0 | Status: SHIPPED | OUTPATIENT
Start: 2021-12-30 | End: 2022-02-24

## 2022-02-03 DIAGNOSIS — E78.00 PURE HYPERCHOLESTEROLEMIA: ICD-10-CM

## 2022-02-03 DIAGNOSIS — I10 ESSENTIAL HYPERTENSION, BENIGN: ICD-10-CM

## 2022-02-04 RX ORDER — ATORVASTATIN CALCIUM 80 MG/1
TABLET, FILM COATED ORAL
Qty: 90 TABLET | Refills: 0 | Status: SHIPPED | OUTPATIENT
Start: 2022-02-04 | End: 2022-02-24

## 2022-02-04 RX ORDER — LOSARTAN POTASSIUM 50 MG/1
TABLET ORAL
Qty: 90 TABLET | Refills: 0 | Status: SHIPPED | OUTPATIENT
Start: 2022-02-04 | End: 2022-02-24

## 2022-02-24 ENCOUNTER — OFFICE VISIT (OUTPATIENT)
Dept: CARDIOLOGY | Facility: PHYSICIAN GROUP | Age: 72
End: 2022-02-24
Payer: COMMERCIAL

## 2022-02-24 VITALS
RESPIRATION RATE: 14 BRPM | HEIGHT: 65 IN | OXYGEN SATURATION: 100 % | HEART RATE: 80 BPM | BODY MASS INDEX: 24.49 KG/M2 | SYSTOLIC BLOOD PRESSURE: 112 MMHG | DIASTOLIC BLOOD PRESSURE: 58 MMHG | WEIGHT: 147 LBS

## 2022-02-24 DIAGNOSIS — Z95.5 STENTED CORONARY ARTERY: ICD-10-CM

## 2022-02-24 DIAGNOSIS — I10 ESSENTIAL HYPERTENSION, BENIGN: ICD-10-CM

## 2022-02-24 DIAGNOSIS — E78.00 PURE HYPERCHOLESTEROLEMIA: ICD-10-CM

## 2022-02-24 DIAGNOSIS — E78.5 DYSLIPIDEMIA: ICD-10-CM

## 2022-02-24 DIAGNOSIS — R73.01 IMPAIRED FASTING GLUCOSE: ICD-10-CM

## 2022-02-24 DIAGNOSIS — I71.40 AAA (ABDOMINAL AORTIC ANEURYSM) WITHOUT RUPTURE (HCC): ICD-10-CM

## 2022-02-24 DIAGNOSIS — I25.10 CORONARY ARTERY DISEASE INVOLVING NATIVE CORONARY ARTERY OF NATIVE HEART WITHOUT ANGINA PECTORIS: ICD-10-CM

## 2022-02-24 PROCEDURE — 99214 OFFICE O/P EST MOD 30 MIN: CPT | Performed by: INTERNAL MEDICINE

## 2022-02-24 RX ORDER — ATORVASTATIN CALCIUM 40 MG/1
40 TABLET, FILM COATED ORAL
Qty: 90 TABLET | Refills: 3 | Status: SHIPPED | OUTPATIENT
Start: 2022-02-24 | End: 2023-03-02 | Stop reason: SDUPTHER

## 2022-02-24 RX ORDER — LOSARTAN POTASSIUM 50 MG/1
50 TABLET ORAL
Qty: 90 TABLET | Refills: 3 | Status: SHIPPED | OUTPATIENT
Start: 2022-02-24 | End: 2023-03-02 | Stop reason: SDUPTHER

## 2022-02-24 RX ORDER — ALIROCUMAB 75 MG/ML
INJECTION, SOLUTION SUBCUTANEOUS
Qty: 6 EACH | Refills: 3 | Status: SHIPPED | OUTPATIENT
Start: 2022-02-24 | End: 2023-02-24 | Stop reason: SDUPTHER

## 2022-02-24 NOTE — PROGRESS NOTES
Cardiology Follow-up Consultation Note    Date of note:   2/24/2022  Primary Care Provider: Luis Woods M.D.  Referring Provider: Sierra Black    Patient Name: Jailyn Griffiths     YOB: 1950  MRN:              [id number]    Chief Complaint: CAD    History of Present Illness: Jailyn Griffiths is a 72 y.o. female whose current medical problems include AAA, CAD s/p PCI 1999, celiac disease, hypertension, and dyslipidemia who is here for follow-up.    At our visit, 8/6/2018:  In terms of hypertension, does not measure BP at home.  At work, typically in the 120s.    In terms of dyslipidemia, poorly controlled, on lipitor 40.  LDL >100. Previously on lipitor 80, however LFTs increased so dose was down to 40.      Does have some leg swelling in left foot, which has improved with NSAIDs. Worse in the morning.     At our visit, 2/4/2019:  Had back surgery.     At our visit, 2/10/2020:  In terms of CAD, no angina. Not exercising besides at work.     In terms of dyslipidemia, slightly improved with diet, but inadequately controlled.     At our visit,  2/25/2021: no updates    Interim Events:  In terms of CAD, no angina.     In terms of hypertension, well controlled.     Dyslipidemia now well controlled on PCSK9.     Still no exercise. Average 10,000 steps a day still.       ROS  New rash on right ankle.     Past Medical History:   Diagnosis Date   • AAA (abdominal aortic aneurysm) without rupture (HCC) 11/2020    Abdominal US with AAA 3.9 x 3.9cm.   • Abnormal LFTs     Neg hepatitis panel, possibly due to medication   • CAD (coronary artery disease), native coronary artery 1999    PCI/stent (Duet 3.0 x 18mm) the RCA   • Celiac disease 2012    With protein losing enteropathy and transient apparent congestive failure provoked by that with normal echocardiogram. Evaluated by gastrointestinal consultants, Dr. Lira   • Dental disorder     Dentures   • Dyslipidemia     Elevated HDL and LDL   •  Hypertension    • Nummular eczema    • Postsurgical menopause     At 29 yo due to bleeding   • Sleep related leg cramps    • Varicose veins of leg with complications          Past Surgical History:   Procedure Laterality Date   • VEIN LIGATION RADIO FREQUENCY  7/19/2010    Performed by CHARLENE ROSE at SURGERY [facility]   • STENT PLACEMENT  1996    RCA done emergently   • GERARD BY LAPAROSCOPY  1996   • APPENDECTOMY     • BREAST BIOPSY      left, normal   • GYN SURGERY  over 30 years ago    hysterectomy   • HYSTERECTOMY, TOTAL ABDOMINAL      with BSO did HRT for 25 yrs now off   • TONSILLECTOMY AND ADENOIDECTOMY           Current Outpatient Medications   Medication Sig Dispense Refill   • atorvastatin (LIPITOR) 80 MG tablet TAKE ONE TABLET BY MOUTH EVERY EVENING 90 Tablet 0   • losartan (COZAAR) 50 MG Tab TAKE ONE TABLET BY MOUTH EVERY DAY 90 Tablet 0   • PRALUENT 75 MG/ML Solution Auto-injector INJECT 75MG UNDER THE SKIN EVERY 14 DAYS 6 Each 0   • metoclopramide (REGLAN) 10 MG Tab TAKE 1 TABLET BY MOUTH 4 TIMES DAILY FOR 7 DAYS     • ondansetron (ZOFRAN) 4 MG Tab tablet Take 4 mg by mouth.     • aspirin EC (ECOTRIN) 81 MG Tablet Delayed Response Take 1 tablet by mouth every day. 90 tablet 3   • clobetasol (TEMOVATE) 0.05 % external solution Apply to psoriasis to scalp twice per day x 2 weeks on then 2 weeks off 1 Bottle 3   • vitamin e (VITAMIN E) 400 UNIT CAPS Take 400 Units by mouth every day.       No current facility-administered medications for this visit.         Allergies   Allergen Reactions   • Nkda [No Known Drug Allergy]          Family History   Problem Relation Age of Onset   • Cancer Mother         breast cancer at age 75   • Diabetes Mother    • Diabetes Father    • Hypertension Father    • Hyperlipidemia Father    • Stroke Father    • Cancer Maternal Grandfather         colon cancer   • Heart Disease Paternal Grandmother          Social History     Socioeconomic History   • Marital status:       Spouse name: Not on file   • Number of children: Not on file   • Years of education: Not on file   • Highest education level: Not on file   Occupational History   • Not on file   Tobacco Use   • Smoking status: Former Smoker     Packs/day: 1.50     Years: 30.00     Pack years: 45.00     Types: Cigarettes   • Smokeless tobacco: Never Used   • Tobacco comment: 1 ppd 20 yrs,quit 1999   Substance and Sexual Activity   • Alcohol use: Yes     Alcohol/week: 8.4 oz     Types: 14 Glasses of wine per week     Comment: 2 per day   • Drug use: No   • Sexual activity: Yes     Partners: Male     Birth control/protection: Post-Menopausal     Comment:    Other Topics Concern   •  Service No   • Blood Transfusions No   • Caffeine Concern No   • Occupational Exposure No   • Hobby Hazards No   • Sleep Concern No   • Stress Concern No   • Weight Concern No   • Special Diet No   • Back Care No   • Exercise No   • Bike Helmet No   • Seat Belt Yes   • Self-Exams Yes   Social History Narrative    Works in [employer].      Social Determinants of Health     Financial Resource Strain: Not on file   Food Insecurity: Not on file   Transportation Needs: Not on file   Physical Activity: Not on file   Stress: Not on file   Social Connections: Not on file   Intimate Partner Violence: Not on file   Housing Stability: Not on file         Physical Exam:  Ambulatory Vitals  There were no vitals taken for this visit.   Oxygen Therapy:     BP Readings from Last 4 Encounters:   02/25/21 130/66   11/12/20 120/70   02/10/20 128/74   02/04/19 120/78       Weight/BMI: There is no height or weight on file to calculate BMI.  Wt Readings from Last 4 Encounters:   02/25/21 65.8 kg (145 lb)   11/12/20 65.9 kg (145 lb 3.2 oz)   02/10/20 69.4 kg (153 lb)   02/04/19 66.7 kg (147 lb)       General: Well appearing and in no apparent distress  Eyes: nl conjunctiva  ENT: OP covered by mask  Neck: JVP <8 cm H2O, no carotid  bruits  Lungs: normal respiratory effort, CTAB  Heart: RRR, no murmurs, no rubs or gallops, no edema bilateral lower extremities. No LV/RV heave on cardiac palpatation. 2+ bilateral radial pulses.   Abdomen: soft, non tender, non distended, no masses, normal bowel sounds.  No HSM.  Extremities/MSK: no clubbing, no cyanosis  Neurological: No focal sensory deficits  Psychiatric: Appropriate affect, A/O x 3, intact judgement and insight  Skin: Warm extremities    Exam repeated in full and unchanged except for as noted above.      Lab Data Review:  No results found for: CHOLSTRLTOT, LDL, HDL, TRIGLYCERIDE    No results found for: SODIUM, POTASSIUM, CHLORIDE, CO2, GLUCOSE, BUN, CREATININE, BUNCREATRAT, GLOMRATE  No results found for: ALKPHOSPHAT, ASTSGOT, ALTSGPT, TBILIRUBIN   No results found for: WBC  No components found for: HBGA1C  No components found for: TROPONIN  No results found for: BNP    OSH labs 8/3/2018:  hgb 11.8  plt 246  Creatinine 0.8  Bun 11  Sodium 136  Potassium 4.4  ast 76 (ULNM 41)  ALT 81 (ULNM 46  Alk phos 82  Total bili 0.7      HDL 82    tg 108  TSH 2.3    OSH labs 2019  LFTs WNL except 53 AST    HD 64    tg 84    OSH labs 2019:  HDL 79      tg 98    OSH labs 2021:    HDL 85  LDL 54  tg 86  BUN 14  Creatinine 1  Sodium 137  Potassium 4.2  LFTs WNL    OSH labs 9/10/2021  hgbA1c 5.3  Creatinine 0.9  Bun 12  Potassium 4.6  LFTs WNL except AST 62      tg 99  LDL 20      Cardiac Imaging and Procedures Review:    EKG dated 2018 : My personal interpretation is NSR, normal EKG.       Echo 2020 [facility]:  LVEF 60%, nl RV, PASP 20, RAP 3, no significant valvular disease.       [facility] (): RCA stenting.     Radiology test Review:  Abdominal US 2018:  4.2cm x 3.9cm x 7.5cm long.  Stable since 2016. Infrarenal.     Abdominal US 2020  3.9cm aneurysm, stable.         Medical Decision Makin. Coronary artery disease  involving native coronary artery of native heart without angina pectoris  -continue aspirin 81mg PO daily  -continue statin  -continue praluent 75mg SC Q2 weeks.   -EF WNL per patient based on imaging 20 years ago    2. AAA (abdominal aortic aneurysm) without rupture (HCC)  Stable  -repeat abdominal ultrasound 11/2022, has been stable since 2016. Ordered today.     3. Essential hypertension, benign  Well controlled at home.    4. Dyslipidemia  Now well controlled  -CMP, lipid panel, hgba1c in 6 months.         Return in about 1 year (around 2/24/2023).      Sancho Badillo MD, [facility]  [facility], [address]  [address] [address]  Phone: [phone]  Fax: [phone]

## 2022-04-13 ENCOUNTER — TELEPHONE (OUTPATIENT)
Dept: CARDIOLOGY | Facility: MEDICAL CENTER | Age: 72
End: 2022-04-13
Payer: COMMERCIAL

## 2022-04-13 NOTE — TELEPHONE ENCOUNTER
Recent Lipid Panel faxed over to [facility] to fax #: [phone]. Fax confirmation received and sent to [employer] for scanning.

## 2022-04-13 NOTE — TELEPHONE ENCOUNTER
PORSHA lewis/[facility] called and is requesting results for a recent LIPID PANEL. Please advise.    FAX: [phone]    Thank you,  Jeffrey WEBB.

## 2022-07-05 ENCOUNTER — TELEPHONE (OUTPATIENT)
Dept: CARDIOLOGY | Facility: MEDICAL CENTER | Age: 72
End: 2022-07-05
Payer: COMMERCIAL

## 2022-07-05 NOTE — TELEPHONE ENCOUNTER
Received paper PA for patients Praluent. Called pt's mail order pharmacy, spoke to Yee who confirms no PA is needed.  Delivery set up and delivered to pt's home on 6/23/2022.

## 2022-07-26 ENCOUNTER — TELEPHONE (OUTPATIENT)
Dept: CARDIOLOGY | Facility: MEDICAL CENTER | Age: 72
End: 2022-07-26
Payer: COMMERCIAL

## 2022-07-26 NOTE — TELEPHONE ENCOUNTER
PORSHA    Caller: [facility]    Topic/issue: PAR    Per Yee the current PAR on file for the PRALUENT is about to  and needs to be re-submitted. Please advise.    COVER MY MEDS KEY : [id number]    Callback Number: N/A

## 2022-08-01 NOTE — TELEPHONE ENCOUNTER
Caller: Nazia [facility]    Topic/issue: Nazia called and stated she called last week for the PA for the script Alirocumab (PRALUENT) 75 MG/ML Solution Auto-injector. Stated the PA  on 2022.    Callback Number: [phone]

## 2022-08-01 NOTE — TELEPHONE ENCOUNTER
BETI HADLEY ([place]cher: [id number])Need help? Call us at [phone]  Status  Sent to [employer]  Next [employer]  The plan will fax you a determination, typically within 1 to 5 business days.    How do I follow up?  Drug  Praluent 75MG/ML auto-injectors  Form  [employer] Commercial Specialty Medication Precertification Request Form  Precertification Request for Specialty Drugs for Commercial Members  [phone]  [phone]

## 2022-08-16 DIAGNOSIS — I10 ESSENTIAL HYPERTENSION, BENIGN: ICD-10-CM

## 2022-08-16 DIAGNOSIS — I25.10 CORONARY ARTERY DISEASE INVOLVING NATIVE CORONARY ARTERY OF NATIVE HEART WITHOUT ANGINA PECTORIS: ICD-10-CM

## 2022-08-16 DIAGNOSIS — E78.00 PURE HYPERCHOLESTEROLEMIA: ICD-10-CM

## 2022-08-16 DIAGNOSIS — E78.5 DYSLIPIDEMIA: ICD-10-CM

## 2022-08-24 ENCOUNTER — TELEPHONE (OUTPATIENT)
Dept: CARDIOLOGY | Facility: MEDICAL CENTER | Age: 72
End: 2022-08-24
Payer: COMMERCIAL

## 2022-08-24 NOTE — TELEPHONE ENCOUNTER
----- Message from Sancho Badillo M.D. sent at 8/23/2022  1:46 PM PDT -----  The patients labs were reviewed and are stable, please call her and let them know. Thank you!  -Dr. Badillo

## 2022-08-24 NOTE — TELEPHONE ENCOUNTER
Called pt [phone]  S/w  Joaquin. Relayed JM response regarding recent labs. Joaquin verbalized understanding and is appreciative of call.

## 2023-02-24 DIAGNOSIS — E78.00 PURE HYPERCHOLESTEROLEMIA: ICD-10-CM

## 2023-02-24 RX ORDER — ALIROCUMAB 75 MG/ML
INJECTION, SOLUTION SUBCUTANEOUS
Qty: 6 EACH | Refills: 0 | Status: SHIPPED | OUTPATIENT
Start: 2023-02-24 | End: 2023-03-16

## 2023-02-24 NOTE — TELEPHONE ENCOUNTER
This is a courtesy refill. Pt must comply with future scheduled appointment for additional refills.

## 2023-03-02 DIAGNOSIS — I10 ESSENTIAL HYPERTENSION, BENIGN: ICD-10-CM

## 2023-03-02 RX ORDER — LOSARTAN POTASSIUM 50 MG/1
50 TABLET ORAL
Qty: 90 TABLET | Refills: 3 | Status: SHIPPED | OUTPATIENT
Start: 2023-03-02 | End: 2023-09-15 | Stop reason: SDUPTHER

## 2023-03-02 NOTE — TELEPHONE ENCOUNTER
Is the patient due for a refill? Yes- courtesy refill     Was the patient seen the past year? No               Date of last office visit: 2/24/2022     Does the patient have an upcoming appointment?  Yes              If yes, When? 3/16/2023     Provider to refill:PORSHA     Does the patients insurance require a 100 day supply?  No

## 2023-03-03 RX ORDER — ATORVASTATIN CALCIUM 40 MG/1
40 TABLET, FILM COATED ORAL
Qty: 100 TABLET | Refills: 3 | Status: SHIPPED | OUTPATIENT
Start: 2023-03-03 | End: 2024-03-22

## 2023-03-16 ENCOUNTER — OFFICE VISIT (OUTPATIENT)
Dept: CARDIOLOGY | Facility: PHYSICIAN GROUP | Age: 73
End: 2023-03-16
Payer: COMMERCIAL

## 2023-03-16 VITALS
WEIGHT: 150 LBS | HEART RATE: 80 BPM | OXYGEN SATURATION: 100 % | SYSTOLIC BLOOD PRESSURE: 132 MMHG | DIASTOLIC BLOOD PRESSURE: 84 MMHG | RESPIRATION RATE: 14 BRPM | BODY MASS INDEX: 24.99 KG/M2 | HEIGHT: 65 IN

## 2023-03-16 DIAGNOSIS — I25.10 CORONARY ARTERY DISEASE INVOLVING NATIVE CORONARY ARTERY OF NATIVE HEART WITHOUT ANGINA PECTORIS: ICD-10-CM

## 2023-03-16 DIAGNOSIS — E78.5 DYSLIPIDEMIA: ICD-10-CM

## 2023-03-16 DIAGNOSIS — E78.00 PURE HYPERCHOLESTEROLEMIA: ICD-10-CM

## 2023-03-16 DIAGNOSIS — I10 ESSENTIAL HYPERTENSION, BENIGN: ICD-10-CM

## 2023-03-16 DIAGNOSIS — I71.40 ABDOMINAL AORTIC ANEURYSM (AAA) WITHOUT RUPTURE, UNSPECIFIED PART (HCC): ICD-10-CM

## 2023-03-16 DIAGNOSIS — R73.01 IMPAIRED FASTING GLUCOSE: ICD-10-CM

## 2023-03-16 PROCEDURE — 99214 OFFICE O/P EST MOD 30 MIN: CPT | Performed by: INTERNAL MEDICINE

## 2023-03-16 RX ORDER — TRIAMCINOLONE ACETONIDE 1 MG/G
CREAM TOPICAL
COMMUNITY
Start: 2023-02-26

## 2023-03-16 RX ORDER — ALIROCUMAB 75 MG/ML
INJECTION, SOLUTION SUBCUTANEOUS
Qty: 6 EACH | Refills: 4 | Status: SHIPPED | OUTPATIENT
Start: 2023-03-16 | End: 2023-07-10

## 2023-03-16 NOTE — PROGRESS NOTES
Cardiology Follow-up Consultation Note    Date of note:   3/16/2023    Primary Care Provider: Luis Woods M.D.  Referring Provider: Sierra Black    Patient Name: Jailyn Griffiths     YOB: 1950  MRN:              [id number]    Chief Complaint: leg pain    History of Present Illness: Jailyn Griffiths is a 73 y.o. female whose current medical problems include AAA, CAD s/p PCI 1999, celiac disease, hypertension, and dyslipidemia who is here for follow-up.    At our visit, 8/6/2018:  In terms of hypertension, does not measure BP at home.  At work, typically in the 120s.    In terms of dyslipidemia, poorly controlled, on lipitor 40.  LDL >100. Previously on lipitor 80, however LFTs increased so dose was down to 40.      Does have some leg swelling in left foot, which has improved with NSAIDs. Worse in the morning.     At our visit, 2/4/2019:  Had back surgery.     At our visit, 2/10/2020:  In terms of CAD, no angina. Not exercising besides at work.     In terms of dyslipidemia, slightly improved with diet, but inadequately controlled.     At our visit,  2/25/2021: no updates    At our visit, 2/24/2022:  In terms of CAD, no angina.     In terms of hypertension, well controlled.     Dyslipidemia now well controlled on PCSK9.     Still no exercise. Average 10,000 steps a day still.     Interim Events:  Has right leg swelling and injury, unclear cuse, just had biopsy and has been on and off steroids.     Sometimes walks to work, 15 minutes, no symptoms, not exercising other times but remains active.     ROS  Constitution: Negative for chills, fever and night sweats.   HENT: Negative for nosebleeds.    Eyes: Negative for vision loss in left eye and vision loss in right eye.   Respiratory: Negative for hemoptysis.    Gastrointestinal: Negative for hematemesis, hematochezia and melena.   Genitourinary: Negative for hematuria.   Neurological: Negative for focal weakness, numbness and paresthesias.        Past Medical History:   Diagnosis Date    AAA (abdominal aortic aneurysm) without rupture 11/2020    Abdominal US with AAA 3.9 x 3.9cm.    Abnormal LFTs     Neg hepatitis panel, possibly due to medication    CAD (coronary artery disease), native coronary artery 1999    PCI/stent (Duet 3.0 x 18mm) the RCA    Celiac disease 2012    With protein losing enteropathy and transient apparent congestive failure provoked by that with normal echocardiogram. Evaluated by gastrointestinal consultants, Dr. Lira    Dental disorder     Dentures    Dyslipidemia     Elevated HDL and LDL    Hypertension     Nummular eczema     Postsurgical menopause     At 29 yo due to bleeding    Sleep related leg cramps     Varicose veins of leg with complications          Past Surgical History:   Procedure Laterality Date    VEIN LIGATION RADIO FREQUENCY  7/19/2010    Performed by CHARLENE ROSE at SURGERY [facility]    STENT PLACEMENT  1996    RCA done emergently    GERARD BY LAPAROSCOPY  1996    APPENDECTOMY      BREAST BIOPSY      left, normal    GYN SURGERY  over 30 years ago    hysterectomy    HYSTERECTOMY, TOTAL ABDOMINAL      with BSO did HRT for 25 yrs now off    TONSILLECTOMY AND ADENOIDECTOMY           Current Outpatient Medications   Medication Sig Dispense Refill    triamcinolone acetonide (KENALOG) 0.1 % Cream APPLY CREAM EXTERNALLY TWICE DAILY ON LEGS, ARMS, AND HANDS FOR UP TO 2 WEEKS REPEAT AS NEEDED. AVOID FACE GROIN AND AXILLAE      atorvastatin (LIPITOR) 40 MG Tab Take 1 Tablet by mouth at bedtime. (Patient taking differently: Take 40 mg by mouth every morning.) 100 Tablet 3    losartan (COZAAR) 50 MG Tab Take 1 Tablet by mouth every day. 90 Tablet 3    Alirocumab (PRALUENT) 75 MG/ML Solution Auto-injector INJECT 75MG UNDER THE SKIN EVERY 14 DAYS 6 Each 0    aspirin EC (ECOTRIN) 81 MG Tablet Delayed Response Take 1 tablet by mouth every day. 90 tablet 3    clobetasol (TEMOVATE) 0.05 % external solution Apply to  psoriasis to scalp twice per day x 2 weeks on then 2 weeks off 1 Bottle 3    vitamin e (VITAMIN E) 400 UNIT CAPS Take 400 Units by mouth every day.       No current facility-administered medications for this visit.         Allergies   Allergen Reactions    Nkda [No Known Drug Allergy]          Family History   Problem Relation Age of Onset    Cancer Mother         breast cancer at age 75    Diabetes Mother     Diabetes Father     Hypertension Father     Hyperlipidemia Father     Stroke Father     Cancer Maternal Grandfather         colon cancer    Heart Disease Paternal Grandmother          Social History     Socioeconomic History    Marital status:      Spouse name: Not on file    Number of children: Not on file    Years of education: Not on file    Highest education level: Not on file   Occupational History    Not on file   Tobacco Use    Smoking status: Former     Packs/day: 1.50     Years: 30.00     Pack years: 45.00     Types: Cigarettes    Smokeless tobacco: Never    Tobacco comments:     1 ppd 20 yrs,quit 1999   Substance and Sexual Activity    Alcohol use: Yes     Alcohol/week: 8.4 oz     Types: 14 Glasses of wine per week     Comment: 2 per day    Drug use: No    Sexual activity: Yes     Partners: Male     Birth control/protection: Post-Menopausal     Comment:    Other Topics Concern     Service No    Blood Transfusions No    Caffeine Concern No    Occupational Exposure No    Hobby Hazards No    Sleep Concern No    Stress Concern No    Weight Concern No    Special Diet No    Back Care No    Exercise No    Bike Helmet No    Seat Belt Yes    Self-Exams Yes   Social History Narrative    Works in [employer].      Social Determinants of Health     Financial Resource Strain: Not on file   Food Insecurity: Not on file   Transportation Needs: Not on file   Physical Activity: Not on file   Stress: Not on file   Social Connections: Not on file   Intimate Partner Violence: Not on  "file   Housing Stability: Not on file         Physical Exam:  Ambulatory Vitals  /84 (BP Location: Left arm, Patient Position: Sitting, BP Cuff Size: Adult)   Pulse 80   Resp 14   Ht 1.651 m (5' 5\")   Wt 68 kg (150 lb)   SpO2 100%    Oxygen Therapy:  Pulse Oximetry: 100 %  BP Readings from Last 4 Encounters:   03/16/23 132/84   02/24/22 112/58   02/25/21 130/66   11/12/20 120/70       Weight/BMI: Body mass index is 24.96 kg/m².  Wt Readings from Last 4 Encounters:   03/16/23 68 kg (150 lb)   02/24/22 66.7 kg (147 lb)   02/25/21 65.8 kg (145 lb)   11/12/20 65.9 kg (145 lb 3.2 oz)       General: No apparent distress  Eyes: nl conjunctiva  ENT: OP covered by mask  Neck: JVP <8 cm H2O, no carotid bruits  Lungs: normal respiratory effort, CTAB  Heart: RRR, no murmurs, no rubs or gallops, no edema bilateral lower extremities. No LV/RV heave on cardiac palpatation. 2+ bilateral radial pulses. 2+ dp pulses. 2+ right pt pulse.   Abdomen: soft, non tender, non distended, no masses, normal bowel sounds.  No HSM.  Extremities/MSK: no clubbing, no cyanosis  Neurological: No focal sensory deficits  Psychiatric: Appropriate affect, A/O x 3, intact judgement and insight  Skin: Warm extremities. Right leg erythematous, healing biopsy wound.     Exam repeated in full and unchanged except for as noted above.      Lab Data Review:  No results found for: CHOLSTRLTOT, LDL, HDL, TRIGLYCERIDE    No results found for: SODIUM, POTASSIUM, CHLORIDE, CO2, GLUCOSE, BUN, CREATININE, BUNCREATRAT, GLOMRATE  No results found for: ALKPHOSPHAT, ASTSGOT, ALTSGPT, TBILIRUBIN   No results found for: WBC  No components found for: HBGA1C  No components found for: TROPONIN  No results found for: BNP    OSH labs 8/3/2018:  hgb 11.8  plt 246  Creatinine 0.8  Bun 11  Sodium 136  Potassium 4.4  ast 76 (ULNM 41)  ALT 81 (ULNM 46  Alk phos 82  Total bili 0.7      HDL 82    tg 108  TSH 2.3    OSH labs 2/1/2019  LFTs WNL except 53 AST  TC " 182  HD 64    tg 84    OSH labs 2019:  HDL 79      tg 98    OSH labs 2021:    HDL 85  LDL 54  tg 86  BUN 14  Creatinine 1  Sodium 137  Potassium 4.2  LFTs WNL    OSH labs 9/10/2021  hgbA1c 5.3  Creatinine 0.9  Bun 12  Potassium 4.6  LFTs WNL except AST 62      tg 99  LDL 20      Cardiac Imaging and Procedures Review:    EKG dated 2018 : My personal interpretation is NSR, normal EKG.       Echo 2020 [facility]:  LVEF 60%, nl RV, PASP 20, RAP 3, no significant valvular disease.       [facility] (): RCA stenting.     Radiology test Review:  Abdominal US 2018:  4.2cm x 3.9cm x 7.5cm long.  Stable since 2016. Infrarenal.     Abdominal US 2020  3.9cm aneurysm, stable.         Medical Decision Makin. Coronary artery disease involving native coronary artery of native heart without angina pectoris  -continue aspirin 81mg PO daily  -continue statin  -continue praluent 75mg SC Q2 weeks.   -EF WNL per patient based on imaging 20 years ago    2. AAA (abdominal aortic aneurysm) without rupture (HCC)  Stable  -request [facility], had f/u ultrasound last year.     3. Essential hypertension, benign  Well controlled at home.    4. Dyslipidemia  Now well controlled  -CMP, lipid panel, hgba1c in 6 months (yearly)    5. Right leg wound - does have good dp and pt pulses, so less likely a peripheral vascular disease component  -f/u with current providers and biopsy results.     Return in about 1 year (around 3/16/2024).      Sancho Badillo MD, [facility]  [facility], [address]  [address], [address] [address]  Phone: [phone]  Fax: [phone]

## 2023-06-26 ENCOUNTER — TELEPHONE (OUTPATIENT)
Dept: CARDIOLOGY | Facility: MEDICAL CENTER | Age: 73
End: 2023-06-26
Payer: COMMERCIAL

## 2023-06-26 NOTE — TELEPHONE ENCOUNTER
PA request received via fax, clinical questions answered, faxed to [facility], confirmation received and scanned into [employer].

## 2023-07-06 ENCOUNTER — TELEPHONE (OUTPATIENT)
Dept: CARDIOLOGY | Facility: PHYSICIAN GROUP | Age: 73
End: 2023-07-06
Payer: COMMERCIAL

## 2023-07-06 NOTE — TELEPHONE ENCOUNTER
Please advise, pt received notice in mail regarding insurance coverage/changes and is unsure if there is any need to change pharmacy or medication. Document has been scanned into chart. Pt received document 5/11/2023

## 2023-07-07 NOTE — TELEPHONE ENCOUNTER
Patient presented to office regarding phone call missed, please call her at work number: [phone] (direct extension)

## 2023-07-07 NOTE — TELEPHONE ENCOUNTER
Returned called, call answered but was told patient is unavailable.  Advised Jailyn return call when she is available. No details left.

## 2023-07-07 NOTE — TELEPHONE ENCOUNTER
Caller: Jailyn Griffiths    Topic/issue: Patient is returning RN's phone call.     Callback Number: [phone] (mobile) or [phone] (work)    Thank you,  -Daly WEBB

## 2023-07-10 ENCOUNTER — TELEPHONE (OUTPATIENT)
Dept: CARDIOLOGY | Facility: MEDICAL CENTER | Age: 73
End: 2023-07-10
Payer: COMMERCIAL

## 2023-07-10 DIAGNOSIS — E78.00 PURE HYPERCHOLESTEROLEMIA: ICD-10-CM

## 2023-07-10 DIAGNOSIS — E78.5 DYSLIPIDEMIA: ICD-10-CM

## 2023-07-10 DIAGNOSIS — I25.10 CORONARY ARTERY DISEASE INVOLVING NATIVE CORONARY ARTERY OF NATIVE HEART WITHOUT ANGINA PECTORIS: ICD-10-CM

## 2023-07-10 RX ORDER — EVOLOCUMAB 140 MG/ML
140 INJECTION, SOLUTION SUBCUTANEOUS
Qty: 6 ML | Refills: 2 | Status: SHIPPED | OUTPATIENT
Start: 2023-07-10 | End: 2024-03-22

## 2023-07-10 NOTE — TELEPHONE ENCOUNTER
Repatha 140 mg SC every 2 weeks ordered for 9 mos supply to preferred pharmacy on file. LOV 03/16/2023 with THERON

## 2023-07-10 NOTE — TELEPHONE ENCOUNTER
To PORSHA: pt's insurance is no longer covering praluent, but will cover repatha. Are you ok with this change? If so, please advise dosing/frequency. Thanks!

## 2023-07-10 NOTE — TELEPHONE ENCOUNTER
PORSHA     Caller: Jailyn Steve Griffiths    Medication Name and Dosage: Alirocumab (PRALUENT) 75 MG/ML Solution Auto-injector [[id number]]     Please call your pharmacy and have them send us a refill request or speak to a live representative, RX number may have changed.    Medication amount left: 1 Shot     Preferred Pharmacy: [facility] on file     Other questions (Topic): Patient states medication is not covered by insurance and is requesting a new prescription for REPATHA. Patient also request an authorization for REPATHA be faxed to her pharmacy. Please advise.     Callback Number (Will only call for issues): [phone] (home) [phone] (work)    Thank you,   Stacia TAYLOR

## 2023-07-18 ENCOUNTER — TELEPHONE (OUTPATIENT)
Dept: CARDIOLOGY | Facility: MEDICAL CENTER | Age: 73
End: 2023-07-18
Payer: COMMERCIAL

## 2023-07-18 NOTE — TELEPHONE ENCOUNTER
PORSHA    Caller: [facility]    Topic/issue: [place] pharmacy called in regards to wanting to know if they can change the medication Evolocumab (REPATHA) 140 MG/ML Solution Prefilled from Syringe to pen please advise.     Callback Number: [phone]     Thank you,     Andrew PEREZ

## 2023-07-19 NOTE — TELEPHONE ENCOUNTER
S/w Cherie RN in clinic. Advised okay to fill with pen vs syringe.    ------------------------------------------------------------------  Called pharmacy [phone]   S/w Govind pharmacist. Advised okay to fill Rx with pts request for pen vs syringe. Govind verbalized understanding and is appreciative of call back.

## 2023-08-25 LAB
CHOLEST SERPL-MCNC: 135 MG/DL
HDLC SERPL-MCNC: 88 MG/DL
LDLC SERPL CALC-MCNC: 33 MG/DL
TRIGL SERPL-MCNC: 63 MG/DL

## 2023-08-31 DIAGNOSIS — I10 ESSENTIAL HYPERTENSION, BENIGN: ICD-10-CM

## 2023-08-31 DIAGNOSIS — E78.5 DYSLIPIDEMIA: ICD-10-CM

## 2023-08-31 DIAGNOSIS — E78.00 PURE HYPERCHOLESTEROLEMIA: ICD-10-CM

## 2023-09-05 ENCOUNTER — TELEPHONE (OUTPATIENT)
Dept: CARDIOLOGY | Facility: MEDICAL CENTER | Age: 73
End: 2023-09-05
Payer: COMMERCIAL

## 2023-09-05 DIAGNOSIS — I10 ESSENTIAL HYPERTENSION, BENIGN: ICD-10-CM

## 2023-09-06 NOTE — TELEPHONE ENCOUNTER
----- Message from Ivette Nettles R.N. sent at 9/1/2023  1:18 PM PDT -----  PORSHA: Please advise on abnormal lab results

## 2023-09-06 NOTE — TELEPHONE ENCOUNTER
Labs stable except mild elevation in AST, a liver enzyme which we will continue to monitor.     Can we check on her most recent blood pressures and enter one into flowchart if <130/80. If not please let me know so we can adjust meds. Thanks!

## 2023-09-08 NOTE — TELEPHONE ENCOUNTER
Called pt [phone]  Relayed lab results and recommendations per JM. Pt reports NO home readings at this time. Pt does have a home device. Advised pt to keep daily log of readings and call clinic on Monday with home readings log. Pt verbalized understanding.       Awaiting call back from pt on 9/11/23

## 2023-09-11 VITALS — DIASTOLIC BLOOD PRESSURE: 84 MMHG | SYSTOLIC BLOOD PRESSURE: 132 MMHG

## 2023-09-11 NOTE — TELEPHONE ENCOUNTER
PORSHA    Caller: Jailyn Griffiths    Topic/issue: Patient is returning's RN phone call and gave is information. It was done with a wrist monitor.    9-9 @ 10am: 147/86 Pulse 76 (Left Arm)  9-9 @ 6pm: 134/87 Pulse 83 (Left Arm)  09-10 @ 1:30am: 133/83 Pulse 85 (Right Arm)  09-10 @ 7:30pm: 113/78 Pulse 96 (right Arm)    Callback Number: [phone]    Thank you,  -Daly WEBB

## 2023-09-15 VITALS — DIASTOLIC BLOOD PRESSURE: 78 MMHG | SYSTOLIC BLOOD PRESSURE: 113 MMHG

## 2023-09-15 RX ORDER — LOSARTAN POTASSIUM 50 MG/1
50 TABLET ORAL
Qty: 30 TABLET | Refills: 0 | COMMUNITY
Start: 2023-09-15 | End: 2023-10-26 | Stop reason: SDUPTHER

## 2023-09-15 NOTE — TELEPHONE ENCOUNTER
Bps noted.     Let's increase her losartan to 50mg PO bid and see if this helps, goal BP in the 110s-120s.     Thank you!

## 2023-09-15 NOTE — TELEPHONE ENCOUNTER
Called pt [phone]  Relayed  recommendations. Pt in agreement. Pt reports NOT needing new Rx at this time as she has plenty at home. Advised pt that MAR will be updated to reflect this change. Pt in agreement to call clinic next week with updated home readings.      MAR updated:    losartan (COZAAR) 50 MG Tab 30 Tablet 0/0 9/15/2023     Sig - Route: Take 1 Tablet by mouth 2 times a day. - Oral    Class: Historical Med    Notes to Pharmacy: Dosage increase per PORSHA

## 2023-09-27 ENCOUNTER — TELEPHONE (OUTPATIENT)
Dept: CARDIOLOGY | Facility: MEDICAL CENTER | Age: 73
End: 2023-09-27
Payer: COMMERCIAL

## 2023-09-27 DIAGNOSIS — I10 ESSENTIAL HYPERTENSION, BENIGN: ICD-10-CM

## 2023-09-27 NOTE — TELEPHONE ENCOUNTER
PORSHA     Caller: Jailyn Griffiths     Topic/issue: Patient states she was instructed to call with blood pressure readings. It was done with a wrist monitor.     9/16  9:11 am 156/95 HR 72  7:06 pm 124/89 HR 82    9/17  9:15 am 129/79 HR 96  8:30 pm 103/50 HR 97    9/18  7:20 am 94/51 HR 77  7:20 pm 104/68 HR 89    9/19  7:15 am 140/96 HR 82  8:07 pm  117/65 HR 78    9/20   7:17 am 149/89 HR 72  6:12 pm 111/35 HR 78    6/21  7:20 am 138/86 HR 76  6:21 pm 113/71 HR 81    9/22  7:20 am 144/84 HR 74  7:10 pm 130/81 HR 88    9/23  8:20 am 130/77 HR 75  9:00 pm 125/72 HR 84    9/24  8:30 am 148/91 HR 76  6:45 pm 127/77 HR 91     Callback Number: [phone]     Thank you,  -Kait MOELLER

## 2023-09-28 VITALS — SYSTOLIC BLOOD PRESSURE: 127 MMHG | HEART RATE: 91 BPM | DIASTOLIC BLOOD PRESSURE: 77 MMHG

## 2023-09-29 NOTE — TELEPHONE ENCOUNTER
PM blood pressures look good. AM blood pressures are close.     Please let her know she should continue to check her AM blood pressure a 3-4 times a week. IF they are consistently >140 then we should add norvasc 2.5mg PO QPM and see if that helps. I'd give things another 1-2 weeks to see if they settle out. Thank you!

## 2023-09-30 NOTE — TELEPHONE ENCOUNTER
Phone Number Called: [phone]    Call outcome: Spoke to patient regarding message below.    Message: Called to inform patient about JM recommendations about BP readings. Patient agreeable to plan at this time. Patient updated to report back with readings in 2 weeks. Patient verbalized understanding and all questions answered at this time. Advised to call back with any questions or concerns.

## 2023-10-26 DIAGNOSIS — I10 ESSENTIAL HYPERTENSION, BENIGN: ICD-10-CM

## 2023-10-26 RX ORDER — LOSARTAN POTASSIUM 50 MG/1
50 TABLET ORAL
Qty: 180 TABLET | Refills: 1 | Status: SHIPPED | OUTPATIENT
Start: 2023-10-26 | End: 2024-03-22

## 2023-10-26 NOTE — TELEPHONE ENCOUNTER
Sent in a refill for patients Losartan 50 mg BID.    Called and spoke with patient, she states her readings have been good and she feels great, but she does not have readings for me at this time.  She stated her systolic has not been greater than 140 and she believes her blood pressure is normal.    I advised patient to log readings and call us in one week, patient verbalized understanding.

## 2023-10-26 NOTE — TELEPHONE ENCOUNTER
Patient presented in [place] office requesting refills on BP meds but did not provided update d BP readings, patient is available to call, routing to RN for assistance regarding BP medications

## 2023-11-02 NOTE — TELEPHONE ENCOUNTER
Patient presented in [place] office with a few days of BP readings, routing to PORSHA RN for advice, please advise, pt will have full log later today

## 2023-11-03 RX ORDER — HYDRALAZINE HYDROCHLORIDE 10 MG/1
10 TABLET, FILM COATED ORAL 3 TIMES DAILY PRN
Qty: 90 TABLET | Refills: 3 | Status: SHIPPED | OUTPATIENT
Start: 2023-11-03

## 2023-11-03 NOTE — TELEPHONE ENCOUNTER
Called patient and discussed JM recommendations. Patient verbalized understanding.    Sent in prescription to patients preferred pharmacy.

## 2023-11-03 NOTE — TELEPHONE ENCOUNTER
Bps overall look good, but for these occasional high values please add hydralazine 10mg PO tid prn SBP >160. Can fill 90 with three refills and let her know, thanks!

## 2024-03-22 ENCOUNTER — OFFICE VISIT (OUTPATIENT)
Dept: CARDIOLOGY | Facility: PHYSICIAN GROUP | Age: 74
End: 2024-03-22
Payer: COMMERCIAL

## 2024-03-22 VITALS
DIASTOLIC BLOOD PRESSURE: 54 MMHG | HEART RATE: 98 BPM | WEIGHT: 140 LBS | HEIGHT: 65 IN | RESPIRATION RATE: 12 BRPM | OXYGEN SATURATION: 100 % | SYSTOLIC BLOOD PRESSURE: 108 MMHG | BODY MASS INDEX: 23.32 KG/M2

## 2024-03-22 DIAGNOSIS — Z95.5 STENTED CORONARY ARTERY: ICD-10-CM

## 2024-03-22 DIAGNOSIS — I25.10 CORONARY ARTERY DISEASE INVOLVING NATIVE CORONARY ARTERY OF NATIVE HEART WITHOUT ANGINA PECTORIS: ICD-10-CM

## 2024-03-22 DIAGNOSIS — E78.5 DYSLIPIDEMIA: ICD-10-CM

## 2024-03-22 DIAGNOSIS — I71.40 ABDOMINAL AORTIC ANEURYSM (AAA) WITHOUT RUPTURE, UNSPECIFIED PART (HCC): ICD-10-CM

## 2024-03-22 DIAGNOSIS — R73.01 IMPAIRED FASTING GLUCOSE: ICD-10-CM

## 2024-03-22 DIAGNOSIS — E78.00 PURE HYPERCHOLESTEROLEMIA: ICD-10-CM

## 2024-03-22 DIAGNOSIS — I10 ESSENTIAL HYPERTENSION, BENIGN: ICD-10-CM

## 2024-03-22 PROCEDURE — 99214 OFFICE O/P EST MOD 30 MIN: CPT | Performed by: INTERNAL MEDICINE

## 2024-03-22 PROCEDURE — 3078F DIAST BP <80 MM HG: CPT | Performed by: INTERNAL MEDICINE

## 2024-03-22 PROCEDURE — 3074F SYST BP LT 130 MM HG: CPT | Performed by: INTERNAL MEDICINE

## 2024-03-22 RX ORDER — ATORVASTATIN CALCIUM 40 MG/1
40 TABLET, FILM COATED ORAL
Qty: 100 TABLET | Refills: 3 | Status: SHIPPED | OUTPATIENT
Start: 2024-03-22

## 2024-03-22 RX ORDER — LOSARTAN POTASSIUM 50 MG/1
50 TABLET ORAL
Qty: 200 TABLET | Refills: 3 | Status: SHIPPED | OUTPATIENT
Start: 2024-03-22

## 2024-03-22 RX ORDER — EVOLOCUMAB 140 MG/ML
140 INJECTION, SOLUTION SUBCUTANEOUS
Qty: 6 ML | Refills: 3 | Status: SHIPPED | OUTPATIENT
Start: 2024-03-22

## 2024-03-22 NOTE — PROGRESS NOTES
Cardiology Follow-up Consultation Note    Date of note:   3/22/2024    Primary Care Provider: Luis Woods M.D.  Referring Provider: Sierra Black    Patient Name: Jailyn Griffiths     YOB: 1950  MRN:              [id number]    Chief Complaint: leg swelling    History of Present Illness: Jailyn Griffiths is a 74 y.o. female whose current medical problems include AAA, CAD s/p PCI 1999, celiac disease, hypertension, and dyslipidemia who is here for follow-up.    At our visit, 8/6/2018:  In terms of hypertension, does not measure BP at home.  At work, typically in the 120s.    In terms of dyslipidemia, poorly controlled, on lipitor 40.  LDL >100. Previously on lipitor 80, however LFTs increased so dose was down to 40.      Does have some leg swelling in left foot, which has improved with NSAIDs. Worse in the morning.     At our visit, 2/4/2019:  Had back surgery.     At our visit, 2/10/2020:  In terms of CAD, no angina. Not exercising besides at work.     In terms of dyslipidemia, slightly improved with diet, but inadequately controlled.     At our visit,  2/25/2021: no updates    At our visit, 2/24/2022:  In terms of CAD, no angina.     In terms of hypertension, well controlled.     Dyslipidemia now well controlled on PCSK9.     Still no exercise. Average 10,000 steps a day still.     At our visit, 3/16/2023:  Has right leg swelling and injury, unclear cuse, just had biopsy and has been on and off steroids.     Sometimes walks to work, 15 minutes, no symptoms, not exercising other times but remains active.     Interim Events:  Some right leg swelling when psoriasis flares.     BP mostly in the 120s. Has not needed hydralazine prn.     I don't exercise.       ROS  No fevers, CVA symptoms, bleeding issues.       Past Medical History:   Diagnosis Date    AAA (abdominal aortic aneurysm) without rupture (HCC) 11/2020    Abdominal US with AAA 3.9 x 3.9cm.    Abnormal LFTs     Neg hepatitis  panel, possibly due to medication    CAD (coronary artery disease), native coronary artery 1999    PCI/stent (Duet 3.0 x 18mm) the RCA    Celiac disease 2012    With protein losing enteropathy and transient apparent congestive failure provoked by that with normal echocardiogram. Evaluated by gastrointestinal consultants, Dr. Lira    Dental disorder     Dentures    Dyslipidemia     Elevated HDL and LDL    Hypertension     Nummular eczema     Postsurgical menopause     At 29 yo due to bleeding    Sleep related leg cramps     Varicose veins of leg with complications          Past Surgical History:   Procedure Laterality Date    VEIN LIGATION RADIO FREQUENCY  7/19/2010    Performed by CHARLENE ROSE at SURGERY [facility] ORS    STENT PLACEMENT  1996    RCA done emergently    GERARD BY LAPAROSCOPY  1996    APPENDECTOMY      BREAST BIOPSY      left, normal    GYN SURGERY  over 30 years ago    hysterectomy    HYSTERECTOMY, TOTAL ABDOMINAL      with BSO did HRT for 25 yrs now off    TONSILLECTOMY AND ADENOIDECTOMY           Current Outpatient Medications   Medication Sig Dispense Refill    hydrALAZINE (APRESOLINE) 10 MG Tab Take 1 Tablet by mouth 3 times a day as needed (for systolic greater than 160). 90 Tablet 3    losartan (COZAAR) 50 MG Tab Take 1 Tablet by mouth 2 times a day. 180 Tablet 1    Evolocumab (REPATHA) 140 MG/ML Solution Prefilled Syringe Inject 140 mg under the skin every 14 days. 6 mL 2    triamcinolone acetonide (KENALOG) 0.1 % Cream APPLY CREAM EXTERNALLY TWICE DAILY ON LEGS, ARMS, AND HANDS FOR UP TO 2 WEEKS REPEAT AS NEEDED. AVOID FACE GROIN AND AXILLAE      atorvastatin (LIPITOR) 40 MG Tab Take 1 Tablet by mouth at bedtime. (Patient taking differently: Take 40 mg by mouth every morning.) 100 Tablet 3    aspirin EC (ECOTRIN) 81 MG Tablet Delayed Response Take 1 tablet by mouth every day. 90 tablet 3    clobetasol (TEMOVATE) 0.05 % external solution Apply to psoriasis to scalp twice per day x 2 weeks  on then 2 weeks off 1 Bottle 3    vitamin e (VITAMIN E) 400 UNIT CAPS Take 400 Units by mouth every day.       No current facility-administered medications for this visit.         Allergies   Allergen Reactions    Nkda [No Known Drug Allergy]          Family History   Problem Relation Age of Onset    Cancer Mother         breast cancer at age 75    Diabetes Mother     Diabetes Father     Hypertension Father     Hyperlipidemia Father     Stroke Father     Cancer Maternal Grandfather         colon cancer    Heart Disease Paternal Grandmother          Social History     Socioeconomic History    Marital status:      Spouse name: Not on file    Number of children: Not on file    Years of education: Not on file    Highest education level: Not on file   Occupational History    Not on file   Tobacco Use    Smoking status: Former     Current packs/day: 1.50     Average packs/day: 1.5 packs/day for 30.0 years (45.0 ttl pk-yrs)     Types: Cigarettes    Smokeless tobacco: Never    Tobacco comments:     1 ppd 20 yrs,quit 1999   Substance and Sexual Activity    Alcohol use: Yes     Alcohol/week: 8.4 oz     Types: 14 Glasses of wine per week     Comment: 2 per day    Drug use: No    Sexual activity: Yes     Partners: Male     Birth control/protection: Post-Menopausal     Comment:    Other Topics Concern     Service No    Blood Transfusions No    Caffeine Concern No    Occupational Exposure No    Hobby Hazards No    Sleep Concern No    Stress Concern No    Weight Concern No    Special Diet No    Back Care No    Exercise No    Bike Helmet No    Seat Belt Yes    Self-Exams Yes   Social History Narrative    Works in [employer].      Social Determinants of Health     Financial Resource Strain: Not on file   Food Insecurity: Not on file   Transportation Needs: Not on file   Physical Activity: Not on file   Stress: Not on file   Social Connections: Not on file   Intimate Partner Violence: Not on file  "  Housing Stability: Not on file         Physical Exam:  Ambulatory Vitals  /54 (BP Location: Left arm, Patient Position: Sitting, BP Cuff Size: Adult)   Pulse 98   Resp 12   Ht 1.651 m (5' 5\")   Wt 63.5 kg (140 lb)   SpO2 100%    Oxygen Therapy:  Pulse Oximetry: 100 %  BP Readings from Last 4 Encounters:   03/22/24 108/54   09/24/23 127/77   09/11/23 132/84   09/15/23 113/78       Weight/BMI: Body mass index is 23.3 kg/m².  Wt Readings from Last 4 Encounters:   03/22/24 63.5 kg (140 lb)   03/16/23 68 kg (150 lb)   02/24/22 66.7 kg (147 lb)   02/25/21 65.8 kg (145 lb)       General: No apparent distress  Eyes: nl conjunctiva  Neck: JVP <8 cm H2O, no carotid bruits  Lungs: normal respiratory effort, CTAB  Heart: RRR, no murmurs, no rubs or gallops, no edema bilateral lower extremities. No LV/RV heave on cardiac palpatation. 2+ bilateral radial pulses.   Abdomen: soft, non tender, non distended, no masses, normal bowel sounds.  No HSM.  Extremities/MSK: no clubbing, no cyanosis  Neurological: No focal sensory deficits  Psychiatric: Appropriate affect, A/O x 3, intact judgement and insight  Skin: Warm extremities. Right leg erythema, improved.     Exam repeated in full and unchanged except for as noted above.      Lab Data Review:  Lab Results   Component Value Date/Time    CHOLSTRLTOT 135 08/25/2023 12:00 AM    LDL 33 08/25/2023 12:00 AM    HDL 88 08/25/2023 12:00 AM    TRIGLYCERIDE 63 08/25/2023 12:00 AM       No results found for: \"SODIUM\", \"POTASSIUM\", \"CHLORIDE\", \"CO2\", \"GLUCOSE\", \"BUN\", \"CREATININE\", \"BUNCREATRAT\", \"GLOMRATE\"  No results found for: \"ALKPHOSPHAT\", \"ASTSGOT\", \"ALTSGPT\", \"TBILIRUBIN\"   No results found for: \"WBC\", \"HEMOGLOBIN\"  No components found for: \"HBGA1C\"  No components found for: \"TROPONIN\"  No results found for: \"BNP\"    OSH labs 8/3/2018:  hgb 11.8  plt 246  Creatinine 0.8  Bun 11  Sodium 136  Potassium 4.4  ast 76 (ULNM 41)  ALT 81 (ULNM 46  Alk phos 82  Total bili 0.7    TC " 207  HDL 82    tg 108  TSH 2.3    OSH labs 2019  LFTs WNL except 53 AST    HD 64    tg 84    OSH labs 2019:  HDL 79      tg 98    OSH labs 2021:    HDL 85  LDL 54  tg 86  BUN 14  Creatinine 1  Sodium 137  Potassium 4.2  LFTs WNL    OSH labs 9/10/2021  hgbA1c 5.3  Creatinine 0.9  Bun 12  Potassium 4.6  LFTs WNL except AST 62      tg 99  LDL 20      Cardiac Imaging and Procedures Review:    EKG dated 2018 : My personal interpretation is NSR, normal EKG.       Echo 2020 [facility]:  LVEF 60%, nl RV, PASP 20, RAP 3, no significant valvular disease.       [facility] (): RCA stenting.     Radiology test Review:  Abdominal US 2018:  4.2cm x 3.9cm x 7.5cm long.  Stable since 2016. Infrarenal.     Abdominal US 2020  3.9cm aneurysm, stable.         Medical Decision Makin. Coronary artery disease involving native coronary artery of native heart without angina pectoris  -continue aspirin 81mg PO daily  -continue statin  -continue praluent 75mg SC Q2 weeks.   -EF WNL per patient based on imaging 20 years ago    2. AAA (abdominal aortic aneurysm) without rupture (HCC)  Stable. [facility]     3. Essential hypertension, benign  Well controlled at home.    4. Dyslipidemia  Now well controlled  -CMP, lipid panel, hgba1c in 6 months (yearly) ordered for August.     5. Right leg wound - improved, followed by derm. Found to be psoriasis.     Return in about 1 year (around 3/22/2025).      Sancho Badillo MD, [facility]  [facility], [address]  [address] [place] [id number]  Phone: [phone]  Fax: [phone]

## 2024-03-22 NOTE — PATIENT INSTRUCTIONS
Please get fasting blood tests in August of 2024.     Please schedule the ultrasound of your aorta this Summer.

## 2024-07-31 DIAGNOSIS — I10 ESSENTIAL HYPERTENSION, BENIGN: ICD-10-CM

## 2024-07-31 RX ORDER — LOSARTAN POTASSIUM 50 MG/1
50 TABLET ORAL
Qty: 90 TABLET | Refills: 3 | OUTPATIENT
Start: 2024-07-31

## 2024-08-06 ENCOUNTER — TELEPHONE (OUTPATIENT)
Dept: CARDIOLOGY | Facility: MEDICAL CENTER | Age: 74
End: 2024-08-06
Payer: COMMERCIAL

## 2024-08-06 DIAGNOSIS — I10 ESSENTIAL HYPERTENSION, BENIGN: ICD-10-CM

## 2024-08-06 RX ORDER — LOSARTAN POTASSIUM 50 MG/1
50 TABLET ORAL
Qty: 200 TABLET | Refills: 3 | Status: SHIPPED | OUTPATIENT
Start: 2024-08-06

## 2024-08-06 NOTE — TELEPHONE ENCOUNTER
Received request via: Pharmacy    Was the patient seen in the last year in this department? Yes    Does the patient have an active prescription (recently filled or refills available) for medication(s) requested?  YES    Pharmacy Name:  [facility]    Does the patient have [facility] Plus and need 100 day supply (blood pressure, diabetes and cholesterol meds only)? Patient does not have SCP

## 2024-08-12 ENCOUNTER — TELEPHONE (OUTPATIENT)
Dept: CARDIOLOGY | Facility: MEDICAL CENTER | Age: 74
End: 2024-08-12
Payer: COMMERCIAL

## 2024-08-12 DIAGNOSIS — I71.40 ABDOMINAL AORTIC ANEURYSM (AAA) WITHOUT RUPTURE, UNSPECIFIED PART (HCC): ICD-10-CM

## 2024-08-12 NOTE — TELEPHONE ENCOUNTER
PORSHA    Caller: Dominique Burden    Topic/issue: patient is scheduled for 8/13/24 , patient will nt be able to receive service until these ICD codes have been updated.    Callback Number: [phone]      Thank you  Tomasa PENA

## 2024-08-12 NOTE — TELEPHONE ENCOUNTER
Phone Number Called: [phone]     Call outcome:  Dominique at [facility]    Message: Called to update dx code.    Abdominal aortic aneurysm  code     Code approved.

## 2024-08-12 NOTE — TELEPHONE ENCOUNTER
PORSHA    Caller: Dominique Walker Registration in [place]    Topic/issue: Ultrasound that Dr. Badillo ordered is not going thru with insurance with current diagnosis and are asking for a new diagnosis code.   Can take verbal over the phone.    Callback Number: [phone]     Thank you,  Kait MOELLER

## 2024-08-20 ENCOUNTER — TELEPHONE (OUTPATIENT)
Dept: CARDIOLOGY | Facility: MEDICAL CENTER | Age: 74
End: 2024-08-20
Payer: COMMERCIAL

## 2024-08-20 DIAGNOSIS — E78.5 DYSLIPIDEMIA: ICD-10-CM

## 2024-08-20 DIAGNOSIS — I71.40 ABDOMINAL AORTIC ANEURYSM (AAA) WITHOUT RUPTURE, UNSPECIFIED PART (HCC): ICD-10-CM

## 2024-08-20 DIAGNOSIS — I10 ESSENTIAL HYPERTENSION, BENIGN: ICD-10-CM

## 2024-08-20 DIAGNOSIS — Z95.5 STENTED CORONARY ARTERY: ICD-10-CM

## 2024-08-20 DIAGNOSIS — E78.00 PURE HYPERCHOLESTEROLEMIA: ICD-10-CM

## 2024-08-20 DIAGNOSIS — I25.10 CORONARY ARTERY DISEASE INVOLVING NATIVE CORONARY ARTERY OF NATIVE HEART WITHOUT ANGINA PECTORIS: ICD-10-CM

## 2024-08-20 NOTE — TELEPHONE ENCOUNTER
Phone Number Called: [phone]    Call outcome: Spoke to patient regarding message below.    Message: Called to advise of JA recommendation. Pt verbalized understanding

## 2024-08-20 NOTE — TELEPHONE ENCOUNTER
----- Message from Physician Assistant Estella De La Paz P.A.-C. sent at 8/20/2024 12:18 PM PDT -----  I have never met this patient but given the aneurysm is now >5cm would recommend referral to vascular surgery for eval of repair.  ----- Message -----  From: Ivette Nettles R.N.  Sent: 8/20/2024  11:55 AM PDT  To: Estella De La Paz P.A.-C.    JA: Please advise on abnormal results, JM patient

## 2024-08-22 LAB
CHOLEST SERPL-MCNC: 150 MG/DL
HDLC SERPL-MCNC: 92 MG/DL
LDLC SERPL CALC-MCNC: 43 MG/DL
TRIGL SERPL-MCNC: 66 MG/DL

## 2024-08-23 DIAGNOSIS — Z95.5 STENTED CORONARY ARTERY: ICD-10-CM

## 2024-08-23 DIAGNOSIS — E78.5 DYSLIPIDEMIA: ICD-10-CM

## 2024-08-23 DIAGNOSIS — E78.00 PURE HYPERCHOLESTEROLEMIA: ICD-10-CM

## 2024-08-23 DIAGNOSIS — I10 ESSENTIAL HYPERTENSION, BENIGN: ICD-10-CM

## 2024-08-23 DIAGNOSIS — R73.01 IMPAIRED FASTING GLUCOSE: ICD-10-CM

## 2024-08-23 DIAGNOSIS — I25.10 CORONARY ARTERY DISEASE INVOLVING NATIVE CORONARY ARTERY OF NATIVE HEART WITHOUT ANGINA PECTORIS: ICD-10-CM

## 2024-09-24 ENCOUNTER — APPOINTMENT (OUTPATIENT)
Dept: VASCULAR SURGERY | Facility: MEDICAL CENTER | Age: 74
End: 2024-09-24
Payer: COMMERCIAL

## 2024-09-24 VITALS
DIASTOLIC BLOOD PRESSURE: 74 MMHG | HEART RATE: 82 BPM | TEMPERATURE: 97.6 F | OXYGEN SATURATION: 95 % | BODY MASS INDEX: 23.49 KG/M2 | HEIGHT: 65 IN | SYSTOLIC BLOOD PRESSURE: 168 MMHG | WEIGHT: 141 LBS

## 2024-09-24 DIAGNOSIS — I10 PRIMARY HYPERTENSION: ICD-10-CM

## 2024-09-24 DIAGNOSIS — I71.40 ABDOMINAL AORTIC ANEURYSM (AAA) WITHOUT RUPTURE, UNSPECIFIED PART (HCC): ICD-10-CM

## 2024-09-24 DIAGNOSIS — E78.5 DYSLIPIDEMIA: ICD-10-CM

## 2024-09-24 DIAGNOSIS — Z86.79 HISTORY OF CORONARY ARTERY DISEASE: ICD-10-CM

## 2024-09-24 PROCEDURE — 3078F DIAST BP <80 MM HG: CPT | Performed by: SURGERY

## 2024-09-24 PROCEDURE — 3077F SYST BP >= 140 MM HG: CPT | Performed by: SURGERY

## 2024-09-24 PROCEDURE — 99203 OFFICE O/P NEW LOW 30 MIN: CPT | Performed by: SURGERY

## 2024-09-24 NOTE — PROGRESS NOTES
VASCULAR SURGERY SERVICE  CONSULT NOTE      Date: 9/24/2024    Referring Provider: Jessica De La Paz PA-C    Consulting Physician: Mikki Manuel MD - [facility]     -------------------------------------------------------------------------------------------------    Reason for consultation:  Abdominal aortic aneurysm.    HPI:  This is a 74 y.o. female with history of abdominal aortic aneurysm who on recent follow-up ultrasound was found to have enlargement of the aneurysm to 5 cm in size.  Patient denies abdominal pain.  She has history of chronic lower back pain and had 2 back surgeries in the past.  She also has history of coronary artery disease, status post stenting in 1999 but has not had any chest pain or shortness of breath.  She stopped smoking many years ago.  There is no history of aneurysm in the family.    Patient has long history of lower extremity varicosities.  She underwent intervention in the past.  She has been wearing support socks.    Past Medical History:   Diagnosis Date    AAA (abdominal aortic aneurysm) without rupture (HCC) 11/2020    Abdominal US with AAA 3.9 x 3.9cm.    Abnormal LFTs     Neg hepatitis panel, possibly due to medication    CAD (coronary artery disease), native coronary artery 1999    PCI/stent (Duet 3.0 x 18mm) the RCA    Celiac disease 2012    With protein losing enteropathy and transient apparent congestive failure provoked by that with normal echocardiogram. Evaluated by gastrointestinal consultants, Dr. Lira    Dental disorder     Dentures    Dyslipidemia     Elevated HDL and LDL    Hypertension     Nummular eczema     Postsurgical menopause     At 29 yo due to bleeding    Sleep related leg cramps     Varicose veins of leg with complications        Past Surgical History:   Procedure Laterality Date    VEIN LIGATION RADIO FREQUENCY  7/19/2010    Performed by MIKKI MANUEL at SURGERY [facility] ORS    STENT PLACEMENT  1996    RCA done emergently    GERARD BY  LAPAROSCOPY  1996    APPENDECTOMY      BREAST BIOPSY      left, normal    GYN SURGERY  over 30 years ago    hysterectomy    HYSTERECTOMY, TOTAL ABDOMINAL      with BSO did HRT for 25 yrs now off    TONSILLECTOMY AND ADENOIDECTOMY         Current Outpatient Medications   Medication Sig Dispense Refill    losartan (COZAAR) 50 MG Tab Take 1 Tablet by mouth 2 times a day. 200 Tablet 3    atorvastatin (LIPITOR) 40 MG Tab Take 1 Tablet by mouth at bedtime. 100 Tablet 3    Evolocumab (REPATHA) Solution Prefilled Syringe SubQ injection syringe Inject 1 mL under the skin every 14 days. 6 mL 3    hydrALAZINE (APRESOLINE) 10 MG Tab Take 1 Tablet by mouth 3 times a day as needed (for systolic greater than 160). 90 Tablet 3    triamcinolone acetonide (KENALOG) 0.1 % Cream APPLY CREAM EXTERNALLY TWICE DAILY ON LEGS, ARMS, AND HANDS FOR UP TO 2 WEEKS REPEAT AS NEEDED. AVOID FACE GROIN AND AXILLAE      aspirin EC (ECOTRIN) 81 MG Tablet Delayed Response Take 1 tablet by mouth every day. 90 tablet 3    clobetasol (TEMOVATE) 0.05 % external solution Apply to psoriasis to scalp twice per day x 2 weeks on then 2 weeks off 1 Bottle 3    vitamin e (VITAMIN E) 400 UNIT CAPS Take 400 Units by mouth every day.       No current facility-administered medications for this visit.       Social History     Socioeconomic History    Marital status:      Spouse name: Not on file    Number of children: Not on file    Years of education: Not on file    Highest education level: Not on file   Occupational History    Not on file   Tobacco Use    Smoking status: Former     Current packs/day: 1.50     Average packs/day: 1.5 packs/day for 30.0 years (45.0 ttl pk-yrs)     Types: Cigarettes    Smokeless tobacco: Never    Tobacco comments:     1 ppd 20 yrs,quit 1999   Substance and Sexual Activity    Alcohol use: Yes     Alcohol/week: 8.4 oz     Types: 14 Glasses of wine per week     Comment: 2 per day    Drug use: No    Sexual activity: Yes      Partners: Male     Birth control/protection: Post-Menopausal     Comment:    Other Topics Concern     Service No    Blood Transfusions No    Caffeine Concern No    Occupational Exposure No    Hobby Hazards No    Sleep Concern No    Stress Concern No    Weight Concern No    Special Diet No    Back Care No    Exercise No    Bike Helmet No    Seat Belt Yes    Self-Exams Yes   Social History Narrative    Works in [employer].      Social Determinants of Health     Financial Resource Strain: Not on file   Food Insecurity: Not on file   Transportation Needs: Not on file   Physical Activity: Not on file   Stress: Not on file   Social Connections: Not on file   Intimate Partner Violence: Not on file   Housing Stability: Not on file       Family History   Problem Relation Age of Onset    Cancer Mother         breast cancer at age 75    Diabetes Mother     Diabetes Father     Hypertension Father     Hyperlipidemia Father     Stroke Father     Cancer Maternal Grandfather         colon cancer    Heart Disease Paternal Grandmother        Allergies:  Nkda [no known drug allergy]    Review of Systems:    Constitutional: Negative for fever, chills, weight loss,   HENT:    Negative for hearing loss or tinnitus    Eyes:    Negative for blurred vision, double vision, or loss of vision  Respiratory:  Negative for cough, hemoptysis, or wheezing    Cardiac:  Negative for chest pain or palpitations or orthopnea  Vascular:  Negative for claudication or rest pain   Gastrointestinal: Negative for nausea, vomiting, or abdominal pain     Negative for hematochezia or melena   Genitourinary: Negative for dysuria, frequency, or hematuria   Musculoskeletal: Positive for back pain  Skin:   Negative for itching or rash  Neurological:  Negative for dizziness, headaches, or tremors     Negative for speech disturbance     Negative for extremity weakness or paresthesias  Endo/Heme:  Negative for easy bruising or  "bleeding  Psychiatric:  Negative for depression, suicidal ideas, or hallucinations    Physical Exam:  BP (!) 168/74 (BP Location: Left arm, Patient Position: Sitting, BP Cuff Size: Adult)   Pulse 82   Temp 36.4 °C (97.6 °F) (Temporal)   Ht 1.651 m (5' 5\")   Wt 64 kg (141 lb)   SpO2 95%     Constitutional: Alert, oriented, no acute distress  HEENT:  Normocephalic and atraumatic, EOMI  Neck:   Supple, no JVD, no bruits.  Cardiovascular: Regular rate and rhythm  Pulmonary:  Good air entry bilaterally  Abdominal:  Soft, non-tender, non-distended     Pulsatile mass, nontender.  Musculoskeletal: No edema, no tenderness  Neurological:  CN II-XII grossly intact, no focal deficits  Skin:   Skin is warm and dry. No rash noted.  Psychiatric:  Normal mood and affect.  Lower extremities: Palpable bilateral common femoral, popliteal, and pedal pulses with multiphasic flow.  No pulsatile masses.  Scattered small varicosities in both lower legs, right greater than left.  No ulceration.    Labs:  Reviewed.    Radiology:  Reviewed.    Assessment:  -Abdominal aortic aneurysm.  -Hypertension.  -Dyslipidemia.  -Lower extremity varicosities.  -History of coronary artery disease.    Plan:  I had a long discussion with patient.  Study results were reviewed with her.  I recommended that a CTA of the abdomen and pelvis be performed to better evaluate the aneurysm and plan for treatment.  Patient agreed.  I ordered the CTA to be done and asked patient to see me after the study is done.  Patient knows to be taken to the emergency room if she developed abdominal pain or new back pain at any time.    Since there is a genetic component of aneurysm, I asked patient to have her family members who are 60 years or older be screened for aneurysm with an ultrasound.  Patient indicated understanding.    Patient's blood pressure was high today.  I asked patient to follow-up with her primary care provider to have her blood pressure rechecked and " medication adjusted.  She indicated understanding.      Mikki Manuel MD  [facility]   [place] preferred or call my office [phone]  __________________________________________________________________  Patient:Jailyn Wilson Aye   MRN:[id number]   CSN:[id number]

## 2024-09-26 ENCOUNTER — TELEPHONE (OUTPATIENT)
Dept: VASCULAR SURGERY | Facility: MEDICAL CENTER | Age: 74
End: 2024-09-26
Payer: COMMERCIAL

## 2024-09-26 NOTE — TELEPHONE ENCOUNTER
Patient LVM to have Dr. Manuel office note faxed to her at [phone].     I called patient back to let her know I faxed the note but she can also get the note from [facility]

## 2024-10-08 ENCOUNTER — OFFICE VISIT (OUTPATIENT)
Dept: VASCULAR SURGERY | Facility: MEDICAL CENTER | Age: 74
End: 2024-10-08
Payer: COMMERCIAL

## 2024-10-08 ENCOUNTER — HOSPITAL ENCOUNTER (OUTPATIENT)
Dept: RADIOLOGY | Facility: MEDICAL CENTER | Age: 74
End: 2024-10-08

## 2024-10-08 VITALS
HEIGHT: 65 IN | SYSTOLIC BLOOD PRESSURE: 142 MMHG | HEART RATE: 86 BPM | DIASTOLIC BLOOD PRESSURE: 68 MMHG | TEMPERATURE: 97.7 F | WEIGHT: 141 LBS | BODY MASS INDEX: 23.49 KG/M2 | OXYGEN SATURATION: 97 %

## 2024-10-08 DIAGNOSIS — I10 PRIMARY HYPERTENSION: ICD-10-CM

## 2024-10-08 DIAGNOSIS — I71.43 INFRARENAL ABDOMINAL AORTIC ANEURYSM (AAA) WITHOUT RUPTURE (HCC): ICD-10-CM

## 2024-10-08 DIAGNOSIS — Z86.79 HISTORY OF CORONARY ARTERY DISEASE: ICD-10-CM

## 2024-10-08 DIAGNOSIS — E78.5 DYSLIPIDEMIA: ICD-10-CM

## 2024-10-08 PROCEDURE — 3077F SYST BP >= 140 MM HG: CPT | Performed by: SURGERY

## 2024-10-08 PROCEDURE — 99214 OFFICE O/P EST MOD 30 MIN: CPT | Performed by: SURGERY

## 2024-10-08 PROCEDURE — 3078F DIAST BP <80 MM HG: CPT | Performed by: SURGERY

## 2024-10-10 ENCOUNTER — TELEPHONE (OUTPATIENT)
Dept: VASCULAR SURGERY | Facility: MEDICAL CENTER | Age: 74
End: 2024-10-10
Payer: COMMERCIAL

## 2024-10-10 ENCOUNTER — APPOINTMENT (OUTPATIENT)
Dept: ADMISSIONS | Facility: MEDICAL CENTER | Age: 74
End: 2024-10-10
Attending: SURGERY
Payer: COMMERCIAL

## 2024-10-17 ENCOUNTER — PRE-ADMISSION TESTING (OUTPATIENT)
Dept: ADMISSIONS | Facility: MEDICAL CENTER | Age: 74
End: 2024-10-17
Attending: SURGERY
Payer: COMMERCIAL

## 2024-10-31 ENCOUNTER — TELEPHONE (OUTPATIENT)
Dept: VASCULAR SURGERY | Facility: MEDICAL CENTER | Age: 74
End: 2024-10-31
Payer: COMMERCIAL

## 2024-11-05 NOTE — OR NURSING
unable to do labs, ecg.  Please do DOS.  Preadmit: Call to patient to encourage increased oral fluid intake the day prior to procedure/surgery including intake of electrolyte drinks such as Gatorade or electrolyte water. Patient may have clear liquids until 2 hours prior to surgery.  Surgery date 11/6/24

## 2024-11-06 ENCOUNTER — APPOINTMENT (OUTPATIENT)
Dept: RADIOLOGY | Facility: MEDICAL CENTER | Age: 74
DRG: 269 | End: 2024-11-06
Attending: SURGERY
Payer: COMMERCIAL

## 2024-11-06 ENCOUNTER — ANESTHESIA (OUTPATIENT)
Dept: SURGERY | Facility: MEDICAL CENTER | Age: 74
DRG: 269 | End: 2024-11-06
Payer: COMMERCIAL

## 2024-11-06 ENCOUNTER — HOSPITAL ENCOUNTER (INPATIENT)
Facility: MEDICAL CENTER | Age: 74
LOS: 1 days | DRG: 269 | End: 2024-11-07
Attending: SURGERY | Admitting: SURGERY
Payer: COMMERCIAL

## 2024-11-06 ENCOUNTER — ANESTHESIA EVENT (OUTPATIENT)
Dept: SURGERY | Facility: MEDICAL CENTER | Age: 74
DRG: 269 | End: 2024-11-06
Payer: COMMERCIAL

## 2024-11-06 PROBLEM — Z98.890 STATUS POST ENDOVASCULAR ANEURYSM REPAIR (EVAR): Status: ACTIVE | Noted: 2024-11-06

## 2024-11-06 PROBLEM — Z86.79 STATUS POST ENDOVASCULAR ANEURYSM REPAIR (EVAR): Status: ACTIVE | Noted: 2024-11-06

## 2024-11-06 LAB
ANION GAP SERPL CALC-SCNC: 13 MMOL/L (ref 7–16)
BUN SERPL-MCNC: 15 MG/DL (ref 8–22)
CALCIUM SERPL-MCNC: 9.8 MG/DL (ref 8.5–10.5)
CHLORIDE SERPL-SCNC: 105 MMOL/L (ref 96–112)
CO2 SERPL-SCNC: 21 MMOL/L (ref 20–33)
CREAT SERPL-MCNC: 0.78 MG/DL (ref 0.5–1.4)
EKG IMPRESSION: NORMAL
ERYTHROCYTE [DISTWIDTH] IN BLOOD BY AUTOMATED COUNT: 52.8 FL (ref 35.9–50)
GFR SERPLBLD CREATININE-BSD FMLA CKD-EPI: 80 ML/MIN/1.73 M 2
GLUCOSE SERPL-MCNC: 101 MG/DL (ref 65–99)
HCT VFR BLD AUTO: 30.3 % (ref 37–47)
HGB BLD-MCNC: 10.2 G/DL (ref 12–16)
MCH RBC QN AUTO: 33.2 PG (ref 27–33)
MCHC RBC AUTO-ENTMCNC: 33.7 G/DL (ref 32.2–35.5)
MCV RBC AUTO: 98.7 FL (ref 81.4–97.8)
PLATELET # BLD AUTO: 147 K/UL (ref 164–446)
PMV BLD AUTO: 10.2 FL (ref 9–12.9)
POTASSIUM SERPL-SCNC: 4.1 MMOL/L (ref 3.6–5.5)
RBC # BLD AUTO: 3.07 M/UL (ref 4.2–5.4)
SODIUM SERPL-SCNC: 139 MMOL/L (ref 135–145)
WBC # BLD AUTO: 3.6 K/UL (ref 4.8–10.8)

## 2024-11-06 PROCEDURE — 85027 COMPLETE CBC AUTOMATED: CPT

## 2024-11-06 PROCEDURE — 160048 HCHG OR STATISTICAL LEVEL 1-5: Performed by: SURGERY

## 2024-11-06 PROCEDURE — A9270 NON-COVERED ITEM OR SERVICE: HCPCS | Performed by: SURGERY

## 2024-11-06 PROCEDURE — 160035 HCHG PACU - 1ST 60 MINS PHASE I: Performed by: SURGERY

## 2024-11-06 PROCEDURE — 700102 HCHG RX REV CODE 250 W/ 637 OVERRIDE(OP): Performed by: SURGERY

## 2024-11-06 PROCEDURE — 35226 REPAIR BLOOD VESSEL DIR LXTR: CPT | Mod: 50,59 | Performed by: SURGERY

## 2024-11-06 PROCEDURE — C1887 CATHETER, GUIDING: HCPCS | Performed by: SURGERY

## 2024-11-06 PROCEDURE — C2628 CATHETER, OCCLUSION: HCPCS | Performed by: SURGERY

## 2024-11-06 PROCEDURE — 34812 OPN FEM ART EXPOS: CPT | Mod: 50,59 | Performed by: SURGERY

## 2024-11-06 PROCEDURE — 700111 HCHG RX REV CODE 636 W/ 250 OVERRIDE (IP): Mod: JZ | Performed by: ANESTHESIOLOGY

## 2024-11-06 PROCEDURE — 160041 HCHG SURGERY MINUTES - EA ADDL 1 MIN LEVEL 4: Performed by: SURGERY

## 2024-11-06 PROCEDURE — 700111 HCHG RX REV CODE 636 W/ 250 OVERRIDE (IP): Performed by: SURGERY

## 2024-11-06 PROCEDURE — 770001 HCHG ROOM/CARE - MED/SURG/GYN PRIV*

## 2024-11-06 PROCEDURE — 700102 HCHG RX REV CODE 250 W/ 637 OVERRIDE(OP): Performed by: ANESTHESIOLOGY

## 2024-11-06 PROCEDURE — 80048 BASIC METABOLIC PNL TOTAL CA: CPT

## 2024-11-06 PROCEDURE — C1874 STENT, COATED/COV W/DEL SYS: HCPCS | Performed by: SURGERY

## 2024-11-06 PROCEDURE — C1769 GUIDE WIRE: HCPCS | Performed by: SURGERY

## 2024-11-06 PROCEDURE — 700101 HCHG RX REV CODE 250: Performed by: ANESTHESIOLOGY

## 2024-11-06 PROCEDURE — 93010 ELECTROCARDIOGRAM REPORT: CPT | Performed by: INTERNAL MEDICINE

## 2024-11-06 PROCEDURE — 34705 EVAC RPR A-BIILIAC NDGFT: CPT | Performed by: SURGERY

## 2024-11-06 PROCEDURE — 700117 HCHG RX CONTRAST REV CODE 255: Performed by: SURGERY

## 2024-11-06 PROCEDURE — C1894 INTRO/SHEATH, NON-LASER: HCPCS | Performed by: SURGERY

## 2024-11-06 PROCEDURE — 160029 HCHG SURGERY MINUTES - 1ST 30 MINS LEVEL 4: Performed by: SURGERY

## 2024-11-06 PROCEDURE — 502000 HCHG MISC OR IMPLANTS RC 0278: Performed by: SURGERY

## 2024-11-06 PROCEDURE — 700101 HCHG RX REV CODE 250: Performed by: SURGERY

## 2024-11-06 PROCEDURE — 160009 HCHG ANES TIME/MIN: Performed by: SURGERY

## 2024-11-06 PROCEDURE — 160002 HCHG RECOVERY MINUTES (STAT): Performed by: SURGERY

## 2024-11-06 PROCEDURE — 93005 ELECTROCARDIOGRAM TRACING: CPT | Performed by: SURGERY

## 2024-11-06 PROCEDURE — 04V03DZ RESTRICTION OF ABDOMINAL AORTA WITH INTRALUMINAL DEVICE, PERCUTANEOUS APPROACH: ICD-10-PCS | Performed by: SURGERY

## 2024-11-06 PROCEDURE — A9270 NON-COVERED ITEM OR SERVICE: HCPCS | Performed by: ANESTHESIOLOGY

## 2024-11-06 PROCEDURE — 700111 HCHG RX REV CODE 636 W/ 250 OVERRIDE (IP): Performed by: ANESTHESIOLOGY

## 2024-11-06 PROCEDURE — 700105 HCHG RX REV CODE 258: Performed by: SURGERY

## 2024-11-06 DEVICE — IMPLANTABLE DEVICE: Type: IMPLANTABLE DEVICE | Site: GROIN | Status: FUNCTIONAL

## 2024-11-06 RX ORDER — ATORVASTATIN CALCIUM 40 MG/1
40 TABLET, FILM COATED ORAL
Status: DISCONTINUED | OUTPATIENT
Start: 2024-11-07 | End: 2024-11-07 | Stop reason: HOSPADM

## 2024-11-06 RX ORDER — BUPIVACAINE HYDROCHLORIDE AND EPINEPHRINE 2.5; 5 MG/ML; UG/ML
INJECTION, SOLUTION EPIDURAL; INFILTRATION; INTRACAUDAL; PERINEURAL
Status: DISCONTINUED | OUTPATIENT
Start: 2024-11-06 | End: 2024-11-06 | Stop reason: HOSPADM

## 2024-11-06 RX ORDER — HYDRALAZINE HYDROCHLORIDE 10 MG/1
10 TABLET, FILM COATED ORAL 3 TIMES DAILY PRN
Status: DISCONTINUED | OUTPATIENT
Start: 2024-11-06 | End: 2024-11-06 | Stop reason: HOSPADM

## 2024-11-06 RX ORDER — IODIXANOL 270 MG/ML
INJECTION, SOLUTION INTRAVASCULAR
Status: DISCONTINUED | OUTPATIENT
Start: 2024-11-06 | End: 2024-11-06 | Stop reason: HOSPADM

## 2024-11-06 RX ORDER — MEPERIDINE HYDROCHLORIDE 25 MG/ML
12.5 INJECTION INTRAMUSCULAR; INTRAVENOUS; SUBCUTANEOUS
Status: DISCONTINUED | OUTPATIENT
Start: 2024-11-06 | End: 2024-11-06 | Stop reason: HOSPADM

## 2024-11-06 RX ORDER — LABETALOL HYDROCHLORIDE 5 MG/ML
INJECTION, SOLUTION INTRAVENOUS PRN
Status: DISCONTINUED | OUTPATIENT
Start: 2024-11-06 | End: 2024-11-06 | Stop reason: SURG

## 2024-11-06 RX ORDER — ONDANSETRON 2 MG/ML
4 INJECTION INTRAMUSCULAR; INTRAVENOUS EVERY 4 HOURS PRN
Status: DISCONTINUED | OUTPATIENT
Start: 2024-11-06 | End: 2024-11-07 | Stop reason: HOSPADM

## 2024-11-06 RX ORDER — LIDOCAINE HYDROCHLORIDE 20 MG/ML
INJECTION, SOLUTION EPIDURAL; INFILTRATION; INTRACAUDAL; PERINEURAL PRN
Status: DISCONTINUED | OUTPATIENT
Start: 2024-11-06 | End: 2024-11-06 | Stop reason: SURG

## 2024-11-06 RX ORDER — HALOPERIDOL 5 MG/ML
1 INJECTION INTRAMUSCULAR
Status: DISCONTINUED | OUTPATIENT
Start: 2024-11-06 | End: 2024-11-06 | Stop reason: HOSPADM

## 2024-11-06 RX ORDER — HYDROMORPHONE HYDROCHLORIDE 1 MG/ML
0.1 INJECTION, SOLUTION INTRAMUSCULAR; INTRAVENOUS; SUBCUTANEOUS
Status: DISCONTINUED | OUTPATIENT
Start: 2024-11-06 | End: 2024-11-06 | Stop reason: HOSPADM

## 2024-11-06 RX ORDER — DEXAMETHASONE SODIUM PHOSPHATE 4 MG/ML
INJECTION, SOLUTION INTRA-ARTICULAR; INTRALESIONAL; INTRAMUSCULAR; INTRAVENOUS; SOFT TISSUE PRN
Status: DISCONTINUED | OUTPATIENT
Start: 2024-11-06 | End: 2024-11-06 | Stop reason: SURG

## 2024-11-06 RX ORDER — ONDANSETRON 2 MG/ML
4 INJECTION INTRAMUSCULAR; INTRAVENOUS
Status: DISCONTINUED | OUTPATIENT
Start: 2024-11-06 | End: 2024-11-06 | Stop reason: HOSPADM

## 2024-11-06 RX ORDER — MORPHINE SULFATE 4 MG/ML
1-2 INJECTION INTRAVENOUS
Status: DISCONTINUED | OUTPATIENT
Start: 2024-11-06 | End: 2024-11-07 | Stop reason: HOSPADM

## 2024-11-06 RX ORDER — HYDRALAZINE HYDROCHLORIDE 20 MG/ML
10 INJECTION INTRAMUSCULAR; INTRAVENOUS EVERY 4 HOURS PRN
Status: DISCONTINUED | OUTPATIENT
Start: 2024-11-06 | End: 2024-11-07 | Stop reason: HOSPADM

## 2024-11-06 RX ORDER — SODIUM CHLORIDE 9 MG/ML
INJECTION, SOLUTION INTRAVENOUS CONTINUOUS
Status: ACTIVE | OUTPATIENT
Start: 2024-11-06 | End: 2024-11-06

## 2024-11-06 RX ORDER — ASPIRIN 81 MG/1
81 TABLET ORAL DAILY
Status: DISCONTINUED | OUTPATIENT
Start: 2024-11-06 | End: 2024-11-07 | Stop reason: HOSPADM

## 2024-11-06 RX ORDER — HYDROCODONE BITARTRATE AND ACETAMINOPHEN 5; 325 MG/1; MG/1
1-2 TABLET ORAL EVERY 6 HOURS PRN
Status: DISCONTINUED | OUTPATIENT
Start: 2024-11-06 | End: 2024-11-07 | Stop reason: HOSPADM

## 2024-11-06 RX ORDER — OXYCODONE HCL 5 MG/5 ML
5 SOLUTION, ORAL ORAL
Status: COMPLETED | OUTPATIENT
Start: 2024-11-06 | End: 2024-11-06

## 2024-11-06 RX ORDER — ENOXAPARIN SODIUM 100 MG/ML
40 INJECTION SUBCUTANEOUS DAILY
Status: DISCONTINUED | OUTPATIENT
Start: 2024-11-07 | End: 2024-11-07 | Stop reason: HOSPADM

## 2024-11-06 RX ORDER — ACETAMINOPHEN 325 MG/1
650 TABLET ORAL EVERY 6 HOURS PRN
Status: DISCONTINUED | OUTPATIENT
Start: 2024-11-06 | End: 2024-11-07 | Stop reason: HOSPADM

## 2024-11-06 RX ORDER — DIPHENHYDRAMINE HYDROCHLORIDE 50 MG/ML
12.5 INJECTION INTRAMUSCULAR; INTRAVENOUS
Status: DISCONTINUED | OUTPATIENT
Start: 2024-11-06 | End: 2024-11-06 | Stop reason: HOSPADM

## 2024-11-06 RX ORDER — DOCUSATE SODIUM 100 MG/1
100 CAPSULE, LIQUID FILLED ORAL 2 TIMES DAILY
Status: DISCONTINUED | OUTPATIENT
Start: 2024-11-06 | End: 2024-11-07 | Stop reason: HOSPADM

## 2024-11-06 RX ORDER — HYDRALAZINE HYDROCHLORIDE 20 MG/ML
5 INJECTION INTRAMUSCULAR; INTRAVENOUS
Status: DISCONTINUED | OUTPATIENT
Start: 2024-11-06 | End: 2024-11-06 | Stop reason: HOSPADM

## 2024-11-06 RX ORDER — HYDROMORPHONE HYDROCHLORIDE 1 MG/ML
0.4 INJECTION, SOLUTION INTRAMUSCULAR; INTRAVENOUS; SUBCUTANEOUS
Status: DISCONTINUED | OUTPATIENT
Start: 2024-11-06 | End: 2024-11-06 | Stop reason: HOSPADM

## 2024-11-06 RX ORDER — CEFAZOLIN SODIUM 1 G/3ML
INJECTION, POWDER, FOR SOLUTION INTRAMUSCULAR; INTRAVENOUS PRN
Status: DISCONTINUED | OUTPATIENT
Start: 2024-11-06 | End: 2024-11-06 | Stop reason: SURG

## 2024-11-06 RX ORDER — LOSARTAN POTASSIUM 50 MG/1
50 TABLET ORAL
Status: DISCONTINUED | OUTPATIENT
Start: 2024-11-06 | End: 2024-11-07 | Stop reason: HOSPADM

## 2024-11-06 RX ORDER — OXYCODONE HCL 5 MG/5 ML
10 SOLUTION, ORAL ORAL
Status: COMPLETED | OUTPATIENT
Start: 2024-11-06 | End: 2024-11-06

## 2024-11-06 RX ORDER — HYDRALAZINE HYDROCHLORIDE 10 MG/1
10 TABLET, FILM COATED ORAL 3 TIMES DAILY PRN
Status: DISCONTINUED | OUTPATIENT
Start: 2024-11-06 | End: 2024-11-06

## 2024-11-06 RX ORDER — ONDANSETRON 2 MG/ML
INJECTION INTRAMUSCULAR; INTRAVENOUS PRN
Status: DISCONTINUED | OUTPATIENT
Start: 2024-11-06 | End: 2024-11-06 | Stop reason: SURG

## 2024-11-06 RX ORDER — ALBUTEROL SULFATE 5 MG/ML
2.5 SOLUTION RESPIRATORY (INHALATION)
Status: DISCONTINUED | OUTPATIENT
Start: 2024-11-06 | End: 2024-11-06 | Stop reason: HOSPADM

## 2024-11-06 RX ORDER — LABETALOL HYDROCHLORIDE 5 MG/ML
5 INJECTION, SOLUTION INTRAVENOUS
Status: DISCONTINUED | OUTPATIENT
Start: 2024-11-06 | End: 2024-11-06 | Stop reason: HOSPADM

## 2024-11-06 RX ORDER — LABETALOL HYDROCHLORIDE 5 MG/ML
10 INJECTION, SOLUTION INTRAVENOUS EVERY 4 HOURS PRN
Status: DISCONTINUED | OUTPATIENT
Start: 2024-11-06 | End: 2024-11-07 | Stop reason: HOSPADM

## 2024-11-06 RX ORDER — HYDROMORPHONE HYDROCHLORIDE 1 MG/ML
0.2 INJECTION, SOLUTION INTRAMUSCULAR; INTRAVENOUS; SUBCUTANEOUS
Status: DISCONTINUED | OUTPATIENT
Start: 2024-11-06 | End: 2024-11-06 | Stop reason: HOSPADM

## 2024-11-06 RX ADMIN — LABETALOL HYDROCHLORIDE 5 MG: 5 INJECTION INTRAVENOUS at 10:14

## 2024-11-06 RX ADMIN — ASPIRIN 81 MG: 81 TABLET, COATED ORAL at 12:44

## 2024-11-06 RX ADMIN — SUGAMMADEX 200 MG: 100 INJECTION, SOLUTION INTRAVENOUS at 09:35

## 2024-11-06 RX ADMIN — DEXAMETHASONE SODIUM PHOSPHATE 8 MG: 4 INJECTION INTRA-ARTICULAR; INTRALESIONAL; INTRAMUSCULAR; INTRAVENOUS; SOFT TISSUE at 07:39

## 2024-11-06 RX ADMIN — FENTANYL CITRATE 50 MCG: 50 INJECTION, SOLUTION INTRAMUSCULAR; INTRAVENOUS at 09:57

## 2024-11-06 RX ADMIN — SODIUM CHLORIDE 2 G: 9 INJECTION, SOLUTION INTRAVENOUS at 21:24

## 2024-11-06 RX ADMIN — OXYCODONE HYDROCHLORIDE 10 MG: 5 SOLUTION ORAL at 09:57

## 2024-11-06 RX ADMIN — LOSARTAN POTASSIUM 50 MG: 50 TABLET, FILM COATED ORAL at 12:44

## 2024-11-06 RX ADMIN — FENTANYL CITRATE 50 MCG: 50 INJECTION, SOLUTION INTRAMUSCULAR; INTRAVENOUS at 10:20

## 2024-11-06 RX ADMIN — HYDRALAZINE HYDROCHLORIDE 5 MG: 20 INJECTION INTRAMUSCULAR; INTRAVENOUS at 10:34

## 2024-11-06 RX ADMIN — FENTANYL CITRATE 100 MCG: 50 INJECTION, SOLUTION INTRAMUSCULAR; INTRAVENOUS at 07:46

## 2024-11-06 RX ADMIN — LABETALOL HYDROCHLORIDE 10 MG: 5 INJECTION, SOLUTION INTRAVENOUS at 08:47

## 2024-11-06 RX ADMIN — FENTANYL CITRATE 100 MCG: 50 INJECTION, SOLUTION INTRAMUSCULAR; INTRAVENOUS at 07:34

## 2024-11-06 RX ADMIN — ONDANSETRON 4 MG: 2 INJECTION INTRAMUSCULAR; INTRAVENOUS at 07:39

## 2024-11-06 RX ADMIN — LABETALOL HYDROCHLORIDE 10 MG: 5 INJECTION, SOLUTION INTRAVENOUS at 08:11

## 2024-11-06 RX ADMIN — LABETALOL HYDROCHLORIDE 5 MG: 5 INJECTION INTRAVENOUS at 10:05

## 2024-11-06 RX ADMIN — HEPARIN SODIUM 7000 UNITS: 1000 INJECTION, SOLUTION INTRAVENOUS; SUBCUTANEOUS at 08:13

## 2024-11-06 RX ADMIN — SODIUM CHLORIDE 2 G: 9 INJECTION, SOLUTION INTRAVENOUS at 13:37

## 2024-11-06 RX ADMIN — CEFAZOLIN 2 G: 1 INJECTION, POWDER, FOR SOLUTION INTRAMUSCULAR; INTRAVENOUS at 07:34

## 2024-11-06 RX ADMIN — LABETALOL HYDROCHLORIDE 10 MG: 5 INJECTION, SOLUTION INTRAVENOUS at 08:02

## 2024-11-06 RX ADMIN — LABETALOL HYDROCHLORIDE 10 MG: 5 INJECTION, SOLUTION INTRAVENOUS at 08:41

## 2024-11-06 RX ADMIN — PROPOFOL 130 MG: 10 INJECTION, EMULSION INTRAVENOUS at 07:39

## 2024-11-06 RX ADMIN — ROCURONIUM BROMIDE 80 MG: 10 INJECTION, SOLUTION INTRAVENOUS at 07:39

## 2024-11-06 RX ADMIN — LABETALOL HYDROCHLORIDE 10 MG: 5 INJECTION, SOLUTION INTRAVENOUS at 09:08

## 2024-11-06 RX ADMIN — LIDOCAINE HYDROCHLORIDE 100 MG: 20 INJECTION, SOLUTION EPIDURAL; INFILTRATION; INTRACAUDAL at 07:39

## 2024-11-06 SDOH — ECONOMIC STABILITY: TRANSPORTATION INSECURITY
IN THE PAST 12 MONTHS, HAS LACK OF RELIABLE TRANSPORTATION KEPT YOU FROM MEDICAL APPOINTMENTS, MEETINGS, WORK OR FROM GETTING THINGS NEEDED FOR DAILY LIVING?: NO

## 2024-11-06 SDOH — ECONOMIC STABILITY: TRANSPORTATION INSECURITY
IN THE PAST 12 MONTHS, HAS THE LACK OF TRANSPORTATION KEPT YOU FROM MEDICAL APPOINTMENTS OR FROM GETTING MEDICATIONS?: NO

## 2024-11-06 ASSESSMENT — LIFESTYLE VARIABLES
HAVE YOU EVER FELT YOU SHOULD CUT DOWN ON YOUR DRINKING: NO
ON A TYPICAL DAY WHEN YOU DRINK ALCOHOL HOW MANY DRINKS DO YOU HAVE: 2
TOTAL SCORE: 0
HAVE PEOPLE ANNOYED YOU BY CRITICIZING YOUR DRINKING: NO
EVER FELT BAD OR GUILTY ABOUT YOUR DRINKING: NO
HOW MANY TIMES IN THE PAST YEAR HAVE YOU HAD 5 OR MORE DRINKS IN A DAY: 0
AVERAGE NUMBER OF DAYS PER WEEK YOU HAVE A DRINK CONTAINING ALCOHOL: 3
EVER HAD A DRINK FIRST THING IN THE MORNING TO STEADY YOUR NERVES TO GET RID OF A HANGOVER: NO
TOTAL SCORE: 0
TOTAL SCORE: 0
CONSUMPTION TOTAL: NEGATIVE
DOES PATIENT WANT TO STOP DRINKING: NO
ALCOHOL_USE: YES

## 2024-11-06 ASSESSMENT — SOCIAL DETERMINANTS OF HEALTH (SDOH)

## 2024-11-06 ASSESSMENT — COGNITIVE AND FUNCTIONAL STATUS - GENERAL
SUGGESTED CMS G CODE MODIFIER DAILY ACTIVITY: CH
DAILY ACTIVITIY SCORE: 24
MOBILITY SCORE: 24
SUGGESTED CMS G CODE MODIFIER MOBILITY: CH

## 2024-11-06 ASSESSMENT — PAIN DESCRIPTION - PAIN TYPE
TYPE: ACUTE PAIN;SURGICAL PAIN
TYPE: ACUTE PAIN
TYPE: ACUTE PAIN;SURGICAL PAIN
TYPE: CHRONIC PAIN

## 2024-11-06 ASSESSMENT — PATIENT HEALTH QUESTIONNAIRE - PHQ9
2. FEELING DOWN, DEPRESSED, IRRITABLE, OR HOPELESS: NOT AT ALL
1. LITTLE INTEREST OR PLEASURE IN DOING THINGS: NOT AT ALL
SUM OF ALL RESPONSES TO PHQ9 QUESTIONS 1 AND 2: 0

## 2024-11-06 NOTE — ANESTHESIA PREPROCEDURE EVALUATION
Case: [id number] Date/Time: 11/06/24 0715    Procedure: ENDOVASCULAR REPAIR OF ABDOMINAL AORTIC ANEURYSM    Pre-op diagnosis: ABDOMINAL AORTIC ANEURYSM    Location: [facility] OR 19 / SURGERY [facility]    Surgeons: Mikki Manuel M.D.            Relevant Problems   CARDIAC   (positive) AAA (abdominal aortic aneurysm) without rupture (HCC)   (positive) Coronary artery disease involving native coronary artery of native heart without angina pectoris   (positive) Essential hypertension, benign   (positive) Stented right coronary artery   (positive) Varicose veins of leg with complications       Physical Exam    Airway   Mallampati: II  TM distance: >3 FB  Neck ROM: full       Cardiovascular - normal exam  Rhythm: regular  Rate: normal  (-) murmur     Dental - normal exam           Pulmonary - normal exam  Breath sounds clear to auscultation     Abdominal    Neurological - normal exam                   Anesthesia Plan    ASA 3       Plan - general       Airway plan will be ETT          Induction: intravenous    Postoperative Plan: Postoperative administration of opioids is intended.    Pertinent diagnostic labs and testing reviewed    Informed Consent:    Anesthetic plan and risks discussed with patient.    Use of blood products discussed with: patient whom consented to blood products.

## 2024-11-06 NOTE — ANESTHESIA POSTPROCEDURE EVALUATION
Patient: Jailyn Griffiths    Procedure Summary       Date: 11/06/24 Room / Location: [place] [address] / SURGERY [facility]    Anesthesia Start: 0730 Anesthesia Stop: 0950    Procedure: ENDOVASCULAR REPAIR OF ABDOMINAL AORTIC ANEURYSM (Bilateral: Groin) Diagnosis: (ABDOMINAL AORTIC ANEURYSM)    Surgeons: Mikki Manuel M.D. Responsible Provider: Darius Ramirez M.D.    Anesthesia Type: general ASA Status: 3            Final Anesthesia Type: general  Last vitals  BP   Blood Pressure : (!) 154/72, Arterial BP: (!) 179/63    Temp   36.1 °C (97 °F)    Pulse   80   Resp   14    SpO2   95 %      Anesthesia Post Evaluation    Patient location during evaluation: PACU  Patient participation: complete - patient participated  Level of consciousness: awake and alert    Airway patency: patent  Anesthetic complications: no  Cardiovascular status: hemodynamically stable  Respiratory status: acceptable  Hydration status: euvolemic    PONV: none          No notable events documented.     Nurse Pain Score: 5 (NPRS)

## 2024-11-06 NOTE — PROGRESS NOTES
Discussed variability between art line read and cuff BP, per anesthesia, tommy Jauergui to medicate based on cuff pressure. Orders to keep SBP <150.

## 2024-11-06 NOTE — ANESTHESIA POSTPROCEDURE EVALUATION
Patient: Jailyn Griffiths    Procedure Summary       Date: 11/06/24 Room / Location: [place] [address] / SURGERY [facility]    Anesthesia Start: 0730 Anesthesia Stop: 0950    Procedure: ENDOVASCULAR REPAIR OF ABDOMINAL AORTIC ANEURYSM (Bilateral: Groin) Diagnosis: (ABDOMINAL AORTIC ANEURYSM)    Surgeons: Mikki Manuel M.D. Responsible Provider: Darius Ramirez M.D.    Anesthesia Type: general ASA Status: 3            Final Anesthesia Type: general  Last vitals  BP   Blood Pressure : (!) 154/72, Arterial BP: (!) 179/63    Temp   36.1 °C (97 °F)    Pulse   80   Resp   14    SpO2   95 %      Anesthesia Post Evaluation    Patient location during evaluation: PACU  Patient participation: complete - patient participated  Level of consciousness: awake and alert    Airway patency: patent  Anesthetic complications: no  Cardiovascular status: hemodynamically stable  Respiratory status: acceptable  Hydration status: euvolemic    PONV: none          No notable events documented.     Nurse Pain Score: 5 (NPRS)

## 2024-11-06 NOTE — PROGRESS NOTES
4 Eyes Skin Assessment Completed by BRAD Baig and BRAD Costello.    Head WDL  Ears WDL  Nose WDL  Mouth WDL  Neck WDL  Breast/Chest WDL  Shoulder Blades WDL  Spine WDL  (R) Arm/Elbow/Hand WDL  (L) Arm/Elbow/Hand WDL  Abdomen WDL  Groin Bilateral Groin Incisions, Closed w Dermabond- Bruising to Periwound  Scrotum/Coccyx/Buttocks Redness and Blanching  (R) Leg WDL  (L) Leg WDL  (R) Heel/Foot/Toe WDL  (L) Heel/Foot/Toe WDL          Devices In Places Blood Pressure Cuff, Pulse Ox, and SCD's, Purewick      Interventions In Place Gray Ear Foams, Pillows, Heels Loaded W/Pillows, and Pressure Redistribution Mattress    Possible Skin Injury No    Pictures Uploaded Into Epic N/A  Wound Consult Placed N/A  RN Wound Prevention Protocol Ordered No

## 2024-11-06 NOTE — CARE PLAN
Problem: Pain - Standard  Goal: Alleviation of pain or a reduction in pain to the patient’s comfort goal  Outcome: Progressing     Problem: Knowledge Deficit - Standard  Goal: Patient and family/care givers will demonstrate understanding of plan of care, disease process/condition, diagnostic tests and medications  Outcome: Progressing     Problem: Skin Integrity  Goal: Skin integrity is maintained or improved  Outcome: Progressing   The patient is Stable - Low risk of patient condition declining or worsening         Progress made toward(s) clinical / shift goals:  Patient denies pain at this time, educated patient on plan of care this shift, Patient turns self in bed     Patient is not progressing towards the following goals:

## 2024-11-06 NOTE — PROGRESS NOTES
Pharmacy Medication Reconciliation      ~Medication reconciliation updated and complete per patient   ~Allergies have been verified and updated   ~No oral ABX within the last 30 days  ~Patient home pharmacy :  [facility] [phone]      ~Anticoagulants (rivaroxaban, apixaban, edoxaban, dabigatran, warfarin, enoxaparin) taken in the last 14 days? No

## 2024-11-06 NOTE — PROGRESS NOTES
"To room T 405 via transport in [facility] from PACU at 1145.  /67   Pulse 93   Temp 36.8 °C (98.2 °F) (Temporal)   Resp 15   Ht 1.651 m (5' 5\")   Wt 65.4 kg (144 lb 2.9 oz)   SpO2 90%   BMI 23.99 kg/m²       Patient reports pain at 0 on a scale of 0-10. Educated patient regarding pharmacologic and non pharmacologic modalities for pain management. Oriented to room call light and smoking policy.  Reviewed plan of care (equipment, incentive spirometer, sequential compression devices, medications, activity, diet, fall precautions, skin care, and pain) with patient and family. Welcome packet given and reviewed with patient, all questions answered. Education provided on oral hygiene program.    AA&Ox4. Denies CP/SOB.  See 2 RN skin note  Initiated on Cardiac diet. Denies N/V.  Pending Void to Purewick Last BM PTA   Pt on Bedrest until 1600.  All needs met at this time. Call light within reach. Pt calls appropriately. Bed low and locked, non skid socks in place. Hourly rounding in place.    "

## 2024-11-06 NOTE — ANESTHESIA TIME REPORT
Anesthesia Start and Stop Event Times       Date Time Event    11/6/2024 0715 Ready for Procedure     0730 Anesthesia Start     0950 Anesthesia Stop          Responsible Staff  11/06/24      Name Role Begin End    Darius Ramirez M.D. Anesth 0730 0950          Overtime Reason:  no overtime (within assigned shift)    Comments:

## 2024-11-06 NOTE — ANESTHESIA PROCEDURE NOTES
Airway    Date/Time: 11/6/2024 7:39 AM    Performed by: Darius Ramirez M.D.  Authorized by: Darius Ramirez M.D.    Location:  OR  Urgency:  Elective  Indications for Airway Management:  Anesthesia      Spontaneous Ventilation: absent    Sedation Level:  Deep  Preoxygenated: Yes    Patient Position:  Sniffing  Final Airway Type:  Endotracheal airway  Final Endotracheal Airway:  ETT  Cuffed: Yes    Technique Used for Successful ETT Placement:  Direct laryngoscopy    Insertion Site:  Oral  Blade Type:  Elisabeth  Laryngoscope Blade/Videolaryngoscope Blade Size:  3  ETT Size (mm):  6.5  Measured from:  Teeth  ETT to Teeth (cm):  20  Placement Verified by: auscultation and capnometry    Cormack-Lehane Classification:  Grade I - full view of glottis  Number of Attempts at Approach:  1

## 2024-11-06 NOTE — ANESTHESIA POSTPROCEDURE EVALUATION
Patient: Jailyn Griffiths    Procedure Summary       Date: 11/06/24 Room / Location: [place] [address] / SURGERY [facility]    Anesthesia Start: 0730 Anesthesia Stop: 0950    Procedure: ENDOVASCULAR REPAIR OF ABDOMINAL AORTIC ANEURYSM (Bilateral: Groin) Diagnosis: (ABDOMINAL AORTIC ANEURYSM)    Surgeons: Mikki Manuel M.D. Responsible Provider: Darius Ramirez M.D.    Anesthesia Type: general ASA Status: 3            Final Anesthesia Type: general  Last vitals  BP   Blood Pressure : (!) 154/72, Arterial BP: (!) 179/63    Temp   36.1 °C (97 °F)    Pulse   80   Resp   14    SpO2   95 %      Anesthesia Post Evaluation    Patient location during evaluation: PACU  Patient participation: complete - patient participated  Level of consciousness: awake and alert    Airway patency: patent  Anesthetic complications: no  Cardiovascular status: hemodynamically stable  Respiratory status: acceptable  Hydration status: euvolemic    PONV: none          No notable events documented.     Nurse Pain Score: 5 (NPRS)

## 2024-11-06 NOTE — OP REPORT
DATE OF SERVICE:  11/06/2024     SURGEON:  Mikki Manule MD     ASSISTANT:  Francoise Schulz PA-C     ANESTHESIOLOGIST:  Darius Ramirez MD     TYPE OF ANESTHESIA:  General anesthesia and local anesthesia with 30 mL of   0.5% Marcaine with epinephrine solution.     PREOPERATIVE DIAGNOSIS:  5 cm abdominal aortic aneurysm.     POSTOPERATIVE DIAGNOSIS:  5 cm abdominal aortic aneurysm.     PROCEDURES:  1.  Bilateral common femoral artery exposure.  2.  Endovascular repair of a 5 cm abdominal aortic aneurysm using Medtronic   Endurant aorto-biiliac endografts:  23 x 14 x 103 mm main device via left   femoral approach, 16 x 16 x 146 mm right iliac limb, and 16 x 13 x 146 mm left   iliac limb.  3.  Direct repair of bilateral common femoral arteries.     INDICATIONS FOR PROCEDURE:  This is a pleasant 74-year-old female who was   referred to see me for a 5 cm infrarenal abdominal aortic aneurysm.    Discussion was made with the patient.  She would like to proceed with a stent   graft repair, fully understanding all risks.     DESCRIPTION OF PROCEDURE:  Informed consent was obtained.  The patient was   taken to the endovascular suite and was placed in the supine position.    Sequential compression devices were applied.  The patient was given Ancef   intravenously.  General anesthesia was induced.  Rodríguez catheter was placed   under sterile condition.  A right radial arterial catheter was placed by the   anesthesiologist for intraoperative blood pressure monitoring.     Next, her lower chest, abdomen, and both thighs were sterilely prepped and draped   in the normal fashion.  A timeout procedure was done.  A  slightly oblique   incision was made in each groin, centering over the common femoral arteries.    These incisions were extended through subcutaneous tissue using the   electrocautery.  The bilateral common femoral, profunda femoral, and superficial   femoral arteries were identified, carefully dissected free from the    surrounding tissue.     The patient was given heparin 6000 units intravenously.  On the right side,   the common femoral artery was cannulated using an access needle.  A guidewire   was passed through the needle.  The needle was removed and replaced with a   5-Dominican sheath.  A guidewire was advanced into the abdominal aorta through   the 5-Dominican sheath.  The guidewire was replaced with a Lunderquist wire.  The   5-Dominican sheath was removed and replaced with a 12-Dominican sheath.     On the left side, the common femoral artery was cannulated using an access   needle.  A guidewire was passed through the needle.  The needle was removed   and replaced with a 5-Dominican sheath.  A guidewire was advanced through the   5-Dominican sheath into the aorta and then replaced with a Lunderquist wire.  The   5-Dominican sheath was removed and replaced with a 12-Dominican sheath.     Next, over the Lunderquist wire on the right side, an Omniflush catheter was   advanced into the abdominal aorta and positioned at L1 vertebral level.  An   abdominal aortogram was performed with the image intensifier angulated at   10-degree English and 10-degree cranial.  There was a single renal artery on each   side.  There was infrarenal abdominal aortic aneurysm seen.     Next, at 12-Dominican sheath was removed over the Lunderquist wire on the left   side.  Over the Lunderquist wire on the left side, a 23 x 14 x 103 mm main   device was advanced into the arterial system and successfully deployed with   the top of the fabric aspect of the stent graft located just below the renal   arteries.  The contralateral gate was opened at 1-2 o'clock position.     Next, cannulation of the contralateral gate was performed from the right side.    Successful cannulation of the contralateral gate was verified by spinning a   pigtail angiographic catheter in the body of the stent graft device.    Retrograde right iliac angiogram was obtained.  The location of the   hypogastric  artery was identified.  Based on the marker catheter, it was   determined that 146 mm long device would be needed.     The marker catheter and the 12-Sri Lankan sheath were removed over the Lunderquist   wire on the right side.  Over the Lunderquist wire on the right side, a 16 x   16 x 146 mm right iliac limb was advanced into the arterial system and   successfully deployed under fluoroscopic guidance with appropriate overlapping   with the main device.  Distally, it was located just above the iliac   bifurcation.  The delivery system was removed and replaced with a 12-Sri Lankan   sheath.     Next, the main device was completely deployed and the delivery system was   removed and replaced with a 12-Sri Lankan sheath.  Retrograde left iliac angiogram   was obtained with a marker catheter in place.  The location of the internal   iliac artery was identified.  The internal iliac artery had severe stenosis at   its origin.  Based on the marker catheter, it was determined that 146 mm main   device would be needed.  The marker catheter and 12-Sri Lankan sheath were removed   over the Lunderquist wire on the left side.  Over the Lunderquist wire on the   left side, 16 x 13 x 146 mm left iliac limb was advanced into the arterial   system and successfully deployed under fluoroscopic guidance with appropriate   overlapping with the main device.  Distally, it was located just above the   iliac bifurcation.     The delivery system was removed and replaced with a 12-Sri Lankan sheath.    Angioplasty of the devices was performed using a Reliant balloon.  A completion   angiogram was obtained with a soft wire in place.  There was excellent   fixation of the stent graft devices.  Proximally, they were located just below   the renal arteries.  Distally, there were located above the iliac   bifurcations, although the left internal iliac artery was difficult to see more   than likely secondary to the severe proximal stenosis.  There was no endoleak    identified.     The marker catheter was removed over the wire.  The 12-Albanian sheaths were   removed.  The arteriotomies were repaired with interrupted 6-0 Prolene sutures.    Prior to completing the repair, backbleeding and flushing were obtained.  The   repair was completed.  Flow was restored distally.  A sterile Doppler probe   was brought into the operative field.  Excellent Doppler flow signals were   obtained over the bilateral superficial femoral arteries and profunda femoral   arteries as well as common femoral arteries.     The patient was given protamine 30 mg intravenously.  The wounds were   irrigated and were found to have excellent hemostasis.  The wounds were   anesthetized with 30 mL of 0.5% Marcaine with epinephrine solution.  The   wounds were closed subcutaneously in layers with running 3-0 Vicryl suture and   subcuticularly with 4-0 Monocryl suture.  The wounds were cleaned and   Dermabond was applied.     ESTIMATED BLOOD LOSS:  25 mL.     CONTRAST USED:  42 mL Visipaque.     FLUOROSCOPIC TIME:  10.8 minutes.     INTRAVENOUS FLUIDS:  900 mL.     COUNTS:  Sponge and instrument count was correct x2.     The patient was then awakened, extubated, taken to recovery area in stable   condition with palpable pedal pulses bilaterally with multiphasic flow.  The   Rodríguez catheter was removed was removed in the operating room.        ______________________________  MD LAURY Lua/AZM    DD:  11/06/2024 10:12  DT:  11/06/2024 10:47    Job#:  [id number]

## 2024-11-06 NOTE — OR SURGEON
Immediate Post OP Note    PreOp Diagnosis: 5cm AAA.      PostOp Diagnosis: Same.      Procedure(s):  ENDOVASCULAR REPAIR OF ABDOMINAL AORTIC ANEURYSM - Wound Class: Clean    Surgeon(s):  Mikki Manuel M.D.    Anesthesiologist/Type of Anesthesia:  Anesthesiologist: Darius Ramirez M.D./General    Surgical Staff:  Assistant: Francoise Schulz P.A.-C.  Circulator: Vannesa Garcia R.N.; Ashley Wren R.N.  Scrub Person: Elaine Chase  Radiology Technologist: Alcira Marinelli    Specimens removed if any:  * No specimens in log *    Estimated Blood Loss: 25.ml.    IVF: 900 ml.    Findings: No endoleak.    Complications: None.    Dictated, #[id number].        11/6/2024 10:01 AM Mikki Manuel M.D.

## 2024-11-06 NOTE — ANESTHESIA PROCEDURE NOTES
Arterial Line    Performed by: Darius Ramirez M.D.  Authorized by: Darius Ramirez M.D.    Start Time:  11/6/2024 7:34 AM  Localization: surface landmarks    Patient Location:  OR  Indication: continuous blood pressure monitoring        Catheter Size:  20 G  Seldinger Technique?: Yes    Site:  Radial artery  Line Secured:  Antimicrobial disc, tape and transparent dressing  Events: patient tolerated procedure well with no complications

## 2024-11-07 ENCOUNTER — APPOINTMENT (OUTPATIENT)
Dept: RADIOLOGY | Facility: MEDICAL CENTER | Age: 74
DRG: 269 | End: 2024-11-07
Attending: SURGERY
Payer: COMMERCIAL

## 2024-11-07 VITALS
SYSTOLIC BLOOD PRESSURE: 110 MMHG | RESPIRATION RATE: 15 BRPM | HEIGHT: 65 IN | TEMPERATURE: 97.9 F | BODY MASS INDEX: 24.02 KG/M2 | OXYGEN SATURATION: 94 % | HEART RATE: 71 BPM | WEIGHT: 144.18 LBS | DIASTOLIC BLOOD PRESSURE: 50 MMHG

## 2024-11-07 LAB
ANION GAP SERPL CALC-SCNC: 9 MMOL/L (ref 7–16)
BUN SERPL-MCNC: 18 MG/DL (ref 8–22)
CALCIUM SERPL-MCNC: 8.6 MG/DL (ref 8.5–10.5)
CHLORIDE SERPL-SCNC: 103 MMOL/L (ref 96–112)
CO2 SERPL-SCNC: 23 MMOL/L (ref 20–33)
CREAT SERPL-MCNC: 1.15 MG/DL (ref 0.5–1.4)
ERYTHROCYTE [DISTWIDTH] IN BLOOD BY AUTOMATED COUNT: 54.2 FL (ref 35.9–50)
GFR SERPLBLD CREATININE-BSD FMLA CKD-EPI: 50 ML/MIN/1.73 M 2
GLUCOSE SERPL-MCNC: 138 MG/DL (ref 65–99)
HCT VFR BLD AUTO: 25.7 % (ref 37–47)
HGB BLD-MCNC: 8.4 G/DL (ref 12–16)
MCH RBC QN AUTO: 33.1 PG (ref 27–33)
MCHC RBC AUTO-ENTMCNC: 32.7 G/DL (ref 32.2–35.5)
MCV RBC AUTO: 101.2 FL (ref 81.4–97.8)
PLATELET # BLD AUTO: 101 K/UL (ref 164–446)
PLATELETS.RETICULATED NFR BLD AUTO: 4.4 % (ref 0.6–13.1)
PMV BLD AUTO: 10.2 FL (ref 9–12.9)
POTASSIUM SERPL-SCNC: 4.3 MMOL/L (ref 3.6–5.5)
RBC # BLD AUTO: 2.54 M/UL (ref 4.2–5.4)
SODIUM SERPL-SCNC: 135 MMOL/L (ref 135–145)
WBC # BLD AUTO: 4.4 K/UL (ref 4.8–10.8)

## 2024-11-07 PROCEDURE — 700102 HCHG RX REV CODE 250 W/ 637 OVERRIDE(OP): Performed by: SURGERY

## 2024-11-07 PROCEDURE — 74018 RADEX ABDOMEN 1 VIEW: CPT

## 2024-11-07 PROCEDURE — A9270 NON-COVERED ITEM OR SERVICE: HCPCS | Performed by: SURGERY

## 2024-11-07 PROCEDURE — 700111 HCHG RX REV CODE 636 W/ 250 OVERRIDE (IP): Performed by: SURGERY

## 2024-11-07 PROCEDURE — 85027 COMPLETE CBC AUTOMATED: CPT

## 2024-11-07 PROCEDURE — 80048 BASIC METABOLIC PNL TOTAL CA: CPT

## 2024-11-07 PROCEDURE — 85055 RETICULATED PLATELET ASSAY: CPT

## 2024-11-07 PROCEDURE — 700105 HCHG RX REV CODE 258: Performed by: SURGERY

## 2024-11-07 RX ADMIN — LOSARTAN POTASSIUM 50 MG: 50 TABLET, FILM COATED ORAL at 05:10

## 2024-11-07 RX ADMIN — ASPIRIN 81 MG: 81 TABLET, COATED ORAL at 05:10

## 2024-11-07 RX ADMIN — SODIUM CHLORIDE 2 G: 9 INJECTION, SOLUTION INTRAVENOUS at 08:50

## 2024-11-07 RX ADMIN — DOCUSATE SODIUM 100 MG: 100 CAPSULE, LIQUID FILLED ORAL at 06:00

## 2024-11-07 ASSESSMENT — PAIN DESCRIPTION - PAIN TYPE
TYPE: ACUTE PAIN
TYPE: ACUTE PAIN

## 2024-11-07 NOTE — PROGRESS NOTES
Jailyn De Leonadan has been provided discharge instructions, to include follow up care, home medications, and activity/diet reviewed. Copy of discharge instructions in patient chart, signed and reviewed. Patient verbalizes the understanding of the discharge instructions. PIV removed, tip intact, pressure dressing applied. Arm band removed. Patient did not have any home meds during admit. Questions and concerns addressed prior to leaving the discharge lounge. Transported via car by son. Patient discharge to home.

## 2024-11-07 NOTE — CARE PLAN
The patient is Stable - Low risk of patient condition declining or worsening    Shift Goals  Clinical Goals: CMS checks and monitor BP  Patient Goals: rest    Progress made toward(s) clinical / shift goals:  CMS checks in place with vitals. Intermittently monitoring BP to ensure systolic is less than 150 and medicating as needed. Pt ambulates up self to bathroom. Pt complains of 5/10 pain and declines PRN medications.    Problem: Pain - Standard  Goal: Alleviation of pain or a reduction in pain to the patient’s comfort goal  Outcome: Progressing     Problem: Hemodynamics  Goal: Patient's hemodynamics, fluid balance and neurologic status will be stable or improve  Outcome: Progressing

## 2024-11-07 NOTE — PROGRESS NOTES
Received report from previous shift RN  Assessment complete.  A&O x 4. Patient calls appropriately.  Patient ambulates with without assist..   Patient has 2/10 pain. Pain managed with prescribed medications.  Denies N&V. Tolerating cardiac diet.  Surgical bilateral groin sites dermabond SHAISTA.  + void, + flatus, last BM 11/5 PTA  Patient denies SOB.  SCD's refused. Patient ambulatory..  Review plan with of care with patient. Call light and personal belongings with in reach. Hourly rounding in place. All needs met at this time.

## 2024-11-07 NOTE — PROGRESS NOTES
Bedside report received, assessment completed    A&O x  4, pt calls appropriately  Mobility: Up with self, Assistive Devices: none  Fall Risk Assessment: No risk  Pain Assessment / Reassessment completed, medication provided per MAR  Diet: Cardiac   LDA:   IV Access: 18 R hand, CDI/ flushed/ SL     GI/: + void, + flatus, PTA BM  DVT Prophylaxis: Lovenox, SCD refused     Reviewed plan of care with patient, bed in lowest position and locked, pt resting comfortably now, call light within reach, all needs met at this time. Interventions will be executed per plan of care

## 2024-11-07 NOTE — DISCHARGE SUMMARY
DATE OF ADMISSION:  11/06/2024   DATE OF DISCHARGE:  11/07/2024     ADMITTING DIAGNOSES:  1.  A 5 cm infrarenal abdominal aortic aneurysm.  2.  Hypertension.  3.  Dyslipidemia.  4.  Chronic anemia.     DISCHARGE DIAGNOSES:  1.  A 5 cm infrarenal abdominal aortic aneurysm.  2.  Hypertension.  3.  Dyslipidemia.  4.  Chronic anemia.     PROCEDURE: Stent graft repair of 5.0 cm infrarenal abdominal aortic aneurysm,   performed on 11/06/2024.     HOSPITAL COURSE:  The patient is a pleasant 74-year-old female with multiple   medical problems, who underwent a stent graft repair of 5 cm infrarenal   abdominal aortic aneurysm on 11/06/2024.  She tolerated the procedure well and   was admitted to the floor postoperatively.  She continued to do well.  By   postop day #1, she was able to tolerate a diet and ambulating independently.    Her incisions remained clean, dry, intact without erythema or drainage.  There   was mild surrounding ecchymosis.  Her pedal pulses were palpable with   multiphasic flow.  She would like to go home and therefore will be discharged   home.     DISCHARGE INSTRUCTIONS:  Activity is as tolerated except no lifting more than   10 pounds for 2 weeks.  The patient was instructed that she may shower, but no   bath or hot tub for 2 weeks.     MEDICATIONS:  Resume home medication.  The patient did not want to be sent   home with pain medication and was therefore advised to take Tylenol as needed   for discomfort.     FOLLOWUP:  Followup in vascular clinic in 2-3 weeks.  The patient was   instructed to call the office to make followup appointment as well as for any   problem.     The patient was also found to be anemic on preoperative blood tests and was   therefore advised to followup with her primary care provider for further   workup.     CONDITION AT THE TIME OF DISCHARGE:  Stable.        ______________________________  MD LAURY Lua/RASHAAD    DD:  11/07/2024 08:34  DT:  11/07/2024  08:54    Job#:  [id number]

## 2024-11-15 ENCOUNTER — PATIENT MESSAGE (OUTPATIENT)
Dept: VASCULAR LAB | Facility: MEDICAL CENTER | Age: 74
End: 2024-11-15
Payer: COMMERCIAL

## 2024-11-15 DIAGNOSIS — I71.40 ABDOMINAL AORTIC ANEURYSM (AAA) WITHOUT RUPTURE, UNSPECIFIED PART (HCC): ICD-10-CM

## 2024-11-15 DIAGNOSIS — Z86.79 S/P ENDOVASCULAR ANEURYSM REPAIR: ICD-10-CM

## 2024-11-15 DIAGNOSIS — Z98.890 S/P ENDOVASCULAR ANEURYSM REPAIR: ICD-10-CM

## 2024-11-15 NOTE — PROGRESS NOTES
Chart reviewed for EVAR/TEVAR surveillance program. Added to lifelong surveillance program. Per protocol, CTA ordered to be completed 4-6 weeks post repair. Surgeon to notify patient of surveillance imaging needed.     [employer] message sent to cl Lorenzo RN   Vascular Medicine Nurse Coordinator

## 2024-11-18 NOTE — PATIENT COMMUNICATION
Called pt to confirm if scheduled CTA at [facility]. Pt states she has not yet but she will get scheduled prior to her f/u 11/26. Pt states she was under the impression that she would have a post op visit to be released back to work, then get imaging done after and have a 2nd f/u to review imaging.

## 2024-11-26 ENCOUNTER — OFFICE VISIT (OUTPATIENT)
Dept: VASCULAR SURGERY | Facility: MEDICAL CENTER | Age: 74
End: 2024-11-26
Payer: COMMERCIAL

## 2024-11-26 VITALS
OXYGEN SATURATION: 98 % | WEIGHT: 141 LBS | TEMPERATURE: 97.6 F | HEIGHT: 65 IN | BODY MASS INDEX: 23.49 KG/M2 | SYSTOLIC BLOOD PRESSURE: 146 MMHG | HEART RATE: 94 BPM | DIASTOLIC BLOOD PRESSURE: 72 MMHG

## 2024-11-26 DIAGNOSIS — Z98.890 STATUS POST ENDOVASCULAR ANEURYSM REPAIR (EVAR): ICD-10-CM

## 2024-11-26 DIAGNOSIS — Z86.79 STATUS POST ENDOVASCULAR ANEURYSM REPAIR (EVAR): ICD-10-CM

## 2024-11-26 PROCEDURE — 99024 POSTOP FOLLOW-UP VISIT: CPT | Performed by: SURGERY

## 2024-11-26 PROCEDURE — 3078F DIAST BP <80 MM HG: CPT | Performed by: SURGERY

## 2024-11-26 PROCEDURE — 3077F SYST BP >= 140 MM HG: CPT | Performed by: SURGERY

## 2024-11-26 NOTE — PROGRESS NOTES
"                 VASCULAR SURGERY                    Postop Note  _________________________________    11/26/2024    Ms. Griffiths returns to the clinic today for follow-up, status post endovascular repair of a 5 cm infrarenal abdominal aortic aneurysm on November 6, 2024.  She has no complaints.      Vitals  BP (!) 146/72 (BP Location: Left arm, Patient Position: Sitting, BP Cuff Size: Adult)   Pulse 94   Temp 36.4 °C (97.6 °F) (Temporal)   Ht 1.651 m (5' 5\")   Wt 64 kg (141 lb)   SpO2 98%   BMI 23.46 kg/m²     Exam  General: Pleasant female in nonapparent distress.  Abdomen: Soft, nondistended, nontender, no pulsatile masses.  Lower extremities: Well-healed incisions in both groins.  Feet are warm, well-perfused.  No pulsatile masses in femoral or popliteal areas.      DiagnosisAssessment: Postop.      Plan:    Doing well.  Patient already has follow-up CTA scheduled on December 18, 2024.  I will review the CTA when become available.  If everything looks good, patient will continue to be followed by vascular medicine service.  Patient indicated understanding and agreed with plan.  I answered all of her questions.    Patient blood pressure was on the high side today, patient should follow-up with her PCP for recheck.      Mikki Manuel MD  [facility]  [phone]  [address], [place] [address]   "

## 2025-01-15 ENCOUNTER — DOCUMENTATION (OUTPATIENT)
Dept: VASCULAR LAB | Facility: MEDICAL CENTER | Age: 75
End: 2025-01-15
Payer: COMMERCIAL

## 2025-01-15 DIAGNOSIS — I71.40 ABDOMINAL AORTIC ANEURYSM (AAA) WITHOUT RUPTURE, UNSPECIFIED PART (HCC): ICD-10-CM

## 2025-01-15 DIAGNOSIS — Z86.79 S/P ENDOVASCULAR ANEURYSM REPAIR: ICD-10-CM

## 2025-01-15 DIAGNOSIS — Z98.890 S/P ENDOVASCULAR ANEURYSM REPAIR: ICD-10-CM

## 2025-01-16 NOTE — PROGRESS NOTES
Imaging reviewed in accordance with institution TEVAR/EVAR guideline  This imaging performed approx 1 month after evar procedure    Review of CTA demonstrates stable endograft without endoleak.    Will ask RN to contact patient and offer initial visit with vascular medicine service. If patient unwilling will need to defer medical management to pcp and/or cards    Anticipate repeat imaging with aortoiliac duplex at one year (nov 2025) in accordance with institution guideline    Michael Bloch, MD  Vascular Care:    Cc:   YOLANDA Manuel

## 2025-01-17 NOTE — PROGRESS NOTES
Pt updated with imaging report and surveillance needs, will establish care when do for imaging next. Referral placed with scheduling note to establish in Nov

## 2025-01-21 ENCOUNTER — TELEPHONE (OUTPATIENT)
Dept: HEALTH INFORMATION MANAGEMENT | Facility: OTHER | Age: 75
End: 2025-01-21
Payer: COMMERCIAL

## 2025-02-25 DIAGNOSIS — I25.10 CORONARY ARTERY DISEASE INVOLVING NATIVE CORONARY ARTERY OF NATIVE HEART WITHOUT ANGINA PECTORIS: ICD-10-CM

## 2025-02-25 DIAGNOSIS — E78.5 DYSLIPIDEMIA: ICD-10-CM

## 2025-02-25 DIAGNOSIS — E78.00 PURE HYPERCHOLESTEROLEMIA: ICD-10-CM

## 2025-02-25 RX ORDER — EVOLOCUMAB 140 MG/ML
140 INJECTION, SOLUTION SUBCUTANEOUS
Qty: 6 ML | Refills: 3 | Status: CANCELLED | OUTPATIENT
Start: 2025-02-25

## 2025-02-25 NOTE — TELEPHONE ENCOUNTER
Is the patient due for a refill? Yes    Was the patient seen the last 15 months? Yes    Date of last office visit: 03/22/2024    Does the patient have an upcoming appointment?  Yes   If yes, When? 05/22/2025    Provider to refill:yes    Does the patient have MCFP Plus and need 100-day supply? (This applies to ALL medications) Patient does not have SCP

## 2025-02-27 DIAGNOSIS — I25.10 CORONARY ARTERY DISEASE INVOLVING NATIVE CORONARY ARTERY OF NATIVE HEART WITHOUT ANGINA PECTORIS: ICD-10-CM

## 2025-02-27 DIAGNOSIS — I10 ESSENTIAL HYPERTENSION, BENIGN: ICD-10-CM

## 2025-02-27 DIAGNOSIS — E78.5 DYSLIPIDEMIA: ICD-10-CM

## 2025-02-27 DIAGNOSIS — E78.00 PURE HYPERCHOLESTEROLEMIA: ICD-10-CM

## 2025-02-27 DIAGNOSIS — Z95.5 STENTED CORONARY ARTERY: ICD-10-CM

## 2025-02-27 RX ORDER — EVOLOCUMAB 140 MG/ML
140 INJECTION, SOLUTION SUBCUTANEOUS
Qty: 6 ML | Refills: 3 | Status: SHIPPED | OUTPATIENT
Start: 2025-02-27

## 2025-02-27 RX ORDER — ATORVASTATIN CALCIUM 40 MG/1
40 TABLET, FILM COATED ORAL
Qty: 100 TABLET | Refills: 3 | Status: SHIPPED | OUTPATIENT
Start: 2025-02-27

## 2025-03-04 DIAGNOSIS — I25.10 CORONARY ARTERY DISEASE INVOLVING NATIVE CORONARY ARTERY OF NATIVE HEART WITHOUT ANGINA PECTORIS: ICD-10-CM

## 2025-03-04 DIAGNOSIS — E78.5 DYSLIPIDEMIA: ICD-10-CM

## 2025-03-04 DIAGNOSIS — E78.00 PURE HYPERCHOLESTEROLEMIA: ICD-10-CM

## 2025-03-04 RX ORDER — EVOLOCUMAB 140 MG/ML
140 INJECTION, SOLUTION SUBCUTANEOUS
Qty: 6 ML | Refills: 3 | OUTPATIENT
Start: 2025-03-04

## 2025-03-04 NOTE — TELEPHONE ENCOUNTER
Received request via: Patient    Was the patient seen in the last year in this department? Yes    Does the patient have an active prescription (recently filled or refills available) for medication(s) requested?  Yes     Pharmacy Name: [facility] - [place] - [address] [[id number]]     Does the patient have USP Plus and need 100-day supply? (This applies to ALL medications) Patient does not have SCP    Thank you,    Andrew [place]

## 2025-03-13 DIAGNOSIS — I25.10 CORONARY ARTERY DISEASE INVOLVING NATIVE CORONARY ARTERY OF NATIVE HEART WITHOUT ANGINA PECTORIS: ICD-10-CM

## 2025-03-13 DIAGNOSIS — E78.00 PURE HYPERCHOLESTEROLEMIA: ICD-10-CM

## 2025-03-13 DIAGNOSIS — E78.5 DYSLIPIDEMIA: ICD-10-CM

## 2025-03-13 NOTE — TELEPHONE ENCOUNTER
Is the patient due for a refill? Yes    Was the patient seen the last 15 months? Yes    Date of last office visit: 03.22.2024    Does the patient have an upcoming appointment?  Yes   If yes, When? 05.22.2025    Provider to refill:PORSHA or AB    Does the patient have half-way Plus and need 100-day supply? (This applies to ALL medications) Patient does not have SCP       **Please advise, Patient came in to [facility] requesting medication to be sent to  [facility] - [place], [place] - [address] [[id number]], Patient last seen PORSHA but has up coming appointment with AM in May.**

## 2025-03-14 RX ORDER — EVOLOCUMAB 140 MG/ML
140 INJECTION, SOLUTION SUBCUTANEOUS
Qty: 6 ML | Refills: 0 | OUTPATIENT
Start: 2025-03-14

## 2025-03-26 ENCOUNTER — TELEPHONE (OUTPATIENT)
Dept: CARDIOLOGY | Facility: MEDICAL CENTER | Age: 75
End: 2025-03-26
Payer: COMMERCIAL

## 2025-03-26 DIAGNOSIS — I10 ESSENTIAL HYPERTENSION, BENIGN: ICD-10-CM

## 2025-03-26 DIAGNOSIS — Z95.5 STENTED CORONARY ARTERY: ICD-10-CM

## 2025-03-26 DIAGNOSIS — I25.10 CORONARY ARTERY DISEASE INVOLVING NATIVE CORONARY ARTERY OF NATIVE HEART WITHOUT ANGINA PECTORIS: ICD-10-CM

## 2025-03-26 DIAGNOSIS — E78.00 PURE HYPERCHOLESTEROLEMIA: ICD-10-CM

## 2025-03-26 DIAGNOSIS — E78.5 DYSLIPIDEMIA: ICD-10-CM

## 2025-03-26 RX ORDER — EVOLOCUMAB 140 MG/ML
140 INJECTION, SOLUTION SUBCUTANEOUS
Qty: 6 EACH | Refills: 3 | Status: SHIPPED | OUTPATIENT
Start: 2025-03-26

## 2025-03-26 RX ORDER — ATORVASTATIN CALCIUM 40 MG/1
40 TABLET, FILM COATED ORAL
Qty: 100 TABLET | Refills: 0 | Status: SHIPPED | OUTPATIENT
Start: 2025-03-26

## 2025-03-26 NOTE — TELEPHONE ENCOUNTER
Received phone call from [facility] pharmacy requesting change to repatha. They requested order for auto injector vs pre filled syringe.    New order placed.     To pharmacy coordinators repatha changed to sureclick vs pre filled per pt pharmacy request. Please release. Thank you.

## 2025-03-26 NOTE — TELEPHONE ENCOUNTER
ADD    Caller: Laisha from [facility]    Topic/issue: Pharmacy calling on behalf of patient. Previous patient of PORSHA requesting her medication atorvastatin (LIPITOR) 40 MG Tab , be sent to the correct preferred pharmacy on file (was previously sent to [facility] which is incorrect).    Pharmacy: [facility] - [place], [place] - [address] [[id number]]     Callback Number: [phone]     Thank you,  Clotilde WEBB

## 2025-03-26 NOTE — TELEPHONE ENCOUNTER
ADD    Caller: Jailyn Griffiths     Topic/issue: Patient states that someone called and can't figure out who it was and she thought it was about the message bellow.    Callback Number: [phone]     Thank you,  Esthela ACOSTA

## 2025-03-27 ENCOUNTER — PATIENT MESSAGE (OUTPATIENT)
Dept: CARDIOLOGY | Facility: MEDICAL CENTER | Age: 75
End: 2025-03-27
Payer: COMMERCIAL

## 2025-05-22 ENCOUNTER — APPOINTMENT (OUTPATIENT)
Dept: CARDIOLOGY | Facility: PHYSICIAN GROUP | Age: 75
End: 2025-05-22
Payer: COMMERCIAL

## 2025-05-22 VITALS
HEIGHT: 65 IN | HEART RATE: 98 BPM | OXYGEN SATURATION: 99 % | RESPIRATION RATE: 14 BRPM | WEIGHT: 145 LBS | SYSTOLIC BLOOD PRESSURE: 112 MMHG | BODY MASS INDEX: 24.16 KG/M2 | DIASTOLIC BLOOD PRESSURE: 66 MMHG

## 2025-05-22 DIAGNOSIS — Z98.890 STATUS POST ENDOVASCULAR ANEURYSM REPAIR (EVAR): ICD-10-CM

## 2025-05-22 DIAGNOSIS — E78.5 DYSLIPIDEMIA: ICD-10-CM

## 2025-05-22 DIAGNOSIS — Z95.5 STENTED CORONARY ARTERY: ICD-10-CM

## 2025-05-22 DIAGNOSIS — I10 ESSENTIAL HYPERTENSION, BENIGN: ICD-10-CM

## 2025-05-22 DIAGNOSIS — I71.40 ABDOMINAL AORTIC ANEURYSM (AAA) WITHOUT RUPTURE, UNSPECIFIED PART (HCC): ICD-10-CM

## 2025-05-22 DIAGNOSIS — Z86.79 STATUS POST ENDOVASCULAR ANEURYSM REPAIR (EVAR): ICD-10-CM

## 2025-05-22 DIAGNOSIS — E78.00 PURE HYPERCHOLESTEROLEMIA: ICD-10-CM

## 2025-05-22 DIAGNOSIS — I25.10 CORONARY ARTERY DISEASE INVOLVING NATIVE CORONARY ARTERY OF NATIVE HEART WITHOUT ANGINA PECTORIS: ICD-10-CM

## 2025-05-22 PROCEDURE — 3078F DIAST BP <80 MM HG: CPT | Performed by: NURSE PRACTITIONER

## 2025-05-22 PROCEDURE — 99214 OFFICE O/P EST MOD 30 MIN: CPT | Performed by: NURSE PRACTITIONER

## 2025-05-22 PROCEDURE — 3074F SYST BP LT 130 MM HG: CPT | Performed by: NURSE PRACTITIONER

## 2025-05-22 RX ORDER — MULTIVITAMIN WITH IRON
500 TABLET ORAL DAILY
COMMUNITY

## 2025-05-22 RX ORDER — EVOLOCUMAB 140 MG/ML
140 INJECTION, SOLUTION SUBCUTANEOUS
Qty: 6 EACH | Refills: 5 | Status: SHIPPED | OUTPATIENT
Start: 2025-05-22

## 2025-05-22 RX ORDER — ERGOCALCIFEROL 1.25 MG/1
CAPSULE, LIQUID FILLED ORAL DAILY
COMMUNITY

## 2025-05-22 RX ORDER — LOSARTAN POTASSIUM 50 MG/1
50 TABLET ORAL
Qty: 200 TABLET | Refills: 3 | Status: SHIPPED | OUTPATIENT
Start: 2025-05-22

## 2025-05-22 RX ORDER — ATORVASTATIN CALCIUM 40 MG/1
40 TABLET, FILM COATED ORAL
Qty: 100 TABLET | Refills: 3 | Status: SHIPPED | OUTPATIENT
Start: 2025-05-22

## 2025-05-22 ASSESSMENT — ENCOUNTER SYMPTOMS
NAUSEA: 0
ABDOMINAL PAIN: 0
COUGH: 0
BRUISES/BLEEDS EASILY: 0
INSOMNIA: 0
HEADACHES: 0
CHILLS: 0
BACK PAIN: 1
SHORTNESS OF BREATH: 0
MYALGIAS: 0
PALPITATIONS: 0
PND: 0
LOSS OF CONSCIOUSNESS: 0
ORTHOPNEA: 0
FEVER: 0
DIZZINESS: 0

## 2025-05-22 NOTE — PROGRESS NOTES
Chief Complaint   Patient presents with    Follow-Up    Coronary Artery Disease    HTN (Controlled)    Hyperlipidemia    Abdominal Aortic Aneurysm     Status post repair in 2024.       Subjective     Jailyn Griffiths is a 75 y.o. female who presents today for annual follow-up of CAD, HTN, hyperlipidemia, and AAA.    Jailyn is a 75 year old female with history of CAD, status post remote PCI/stent to the RCA in , hypertension, hyperlipidemia (treated with statin and PCSK9 inhibitor) and AAA, status post repair in 2024. She was previously followed by Dr. Badillo, and last seen in 2024.     She is here today for annual follow-up. From a cardiac standpoint, she is doing well: no chest pain, pressure, tightness or discomfort; no palpitations; no shortness of breath, orthopnea or PND; no dizziness or syncope; no LE edema. BP is stable. She tolerates Repatha well.    Past Medical History[1]  Past Surgical History[2]  Family History   Problem Relation Age of Onset    Cancer Mother         breast cancer at age 75    Diabetes Mother     Diabetes Father     Hypertension Father     Hyperlipidemia Father     Stroke Father     Cancer Maternal Grandfather         colon cancer    Heart Disease Paternal Grandmother      Social History     Socioeconomic History    Marital status:      Spouse name: Not on file    Number of children: Not on file    Years of education: Not on file    Highest education level: Not on file   Occupational History    Not on file   Tobacco Use    Smoking status: Former     Average packs/day: 1.5 packs/day for 30.0 years (45.0 ttl pk-yrs)     Types: Cigarettes     Start date:      Quit date: 1970     Years since quittin.4    Smokeless tobacco: Never    Tobacco comments:     1 ppd 20 yrs,quit    Vaping Use    Vaping status: Never Used   Substance and Sexual Activity    Alcohol use: Yes     Alcohol/week: 8.4 oz     Types: 14 Glasses of wine per week     Comment: 2  per day    Drug use: No    Sexual activity: Yes     Partners: Male     Birth control/protection: Post-Menopausal     Comment:    Other Topics Concern     Service No    Blood Transfusions No    Caffeine Concern No    Occupational Exposure No    Hobby Hazards No    Sleep Concern No    Stress Concern No    Weight Concern No    Special Diet No    Back Care No    Exercise No    Bike Helmet No    Seat Belt Yes    Self-Exams Yes   Social History Narrative    Works in [employer].      Social Drivers of Health     Financial Resource Strain: Not on file   Food Insecurity: No Food Insecurity (11/6/2024)    Hunger Vital Sign     Worried About Running Out of Food in the Last Year: Never true     Ran Out of Food in the Last Year: Never true   Transportation Needs: No Transportation Needs (11/6/2024)    PRAPARE - Transportation     Lack of Transportation (Medical): No     Lack of Transportation (Non-Medical): No   Physical Activity: Not on file   Stress: Not on file   Social Connections: Not on file   Intimate Partner Violence: Not At Risk (11/6/2024)    Humiliation, Afraid, Rape, and Kick questionnaire     Fear of Current or Ex-Partner: No     Emotionally Abused: No     Physically Abused: No     Sexually Abused: No   Housing Stability: Low Risk  (11/6/2024)    Housing Stability Vital Sign     Unable to Pay for Housing in the Last Year: No     Number of Times Moved in the Last Year: 0     Homeless in the Last Year: No     Allergies[3]  Encounter Medications[4]  Review of Systems   Constitutional:  Negative for chills and fever.   HENT:  Negative for congestion.    Respiratory:  Negative for cough and shortness of breath.    Cardiovascular:  Negative for chest pain, palpitations, orthopnea, leg swelling and PND.   Gastrointestinal:  Negative for abdominal pain and nausea.   Musculoskeletal:  Positive for back pain. Negative for myalgias.   Skin:  Negative for rash.   Neurological:  Negative for  "dizziness, loss of consciousness and headaches.   Endo/Heme/Allergies:  Does not bruise/bleed easily.   Psychiatric/Behavioral:  The patient does not have insomnia.               Objective     /66 (BP Location: Left arm, Patient Position: Sitting, BP Cuff Size: Adult)   Pulse 98   Resp 14   Ht 1.651 m (5' 5\")   Wt 65.8 kg (145 lb)   SpO2 99%   BMI 24.13 kg/m²     Physical Exam  Constitutional:       Appearance: She is well-developed.   HENT:      Head: Normocephalic.   Neck:      Vascular: No JVD.   Cardiovascular:      Rate and Rhythm: Normal rate and regular rhythm.      Heart sounds: Murmur heard.      Systolic murmur is present with a grade of 2/6.      Comments: Murmur at RUSB heard.  Pulmonary:      Effort: Pulmonary effort is normal. No respiratory distress.      Breath sounds: Normal breath sounds. No wheezing or rales.   Abdominal:      General: Bowel sounds are normal. There is no distension.      Palpations: Abdomen is soft.      Tenderness: There is no abdominal tenderness.   Musculoskeletal:         General: Normal range of motion.      Cervical back: Normal range of motion and neck supple.   Skin:     General: Skin is warm and dry.      Findings: No rash.   Neurological:      Mental Status: She is alert and oriented to person, place, and time.       ECHOCARDIOGRAPHY:    RESULTS OF TTE OF 11/2020 ([facility]):  Normal LV size  LVEF 60%  Normal RA, LA and RV  Mild MR  Mild TR     SURGICAL PROCEDURE(S):    PROCEDURES OF 11/6/2024:  1.  Bilateral common femoral artery exposure.  2.  Endovascular repair of a 5 cm abdominal aortic aneurysm using [employer]   Endurant aorto-biiliac endografts:  23 x 14 x 103 mm main device via left   femoral approach, 16 x 16 x 146 mm right iliac limb, and 16 x 13 x 146 mm left   iliac limb.  3.  Direct repair of bilateral common femoral arteries.    LABS:    Lab Results   Component Value Date/Time    CHOLSTRLTOT 150 08/22/2024 12:00 AM    LDL 43 08/22/2024 12:00 AM    " HDL 92 08/22/2024 12:00 AM    TRIGLYCERIDE 66 08/22/2024 12:00 AM          Assessment & Plan     1. Coronary artery disease involving native coronary artery of native heart without angina pectoris  atorvastatin (LIPITOR) 40 MG Tab    Evolocumab (REPATHA SURECLICK) 140 MG/ML Solution Auto-injector SubQ injection pen      2. Stented right coronary artery  atorvastatin (LIPITOR) 40 MG Tab      3. Essential hypertension, benign  losartan (COZAAR) 50 MG Tab    atorvastatin (LIPITOR) 40 MG Tab      4. Dyslipidemia  atorvastatin (LIPITOR) 40 MG Tab    Evolocumab (REPATHA SURECLICK) 140 MG/ML Solution Auto-injector SubQ injection pen    Comp Metabolic Panel    Lipid Profile      5. Pure hypercholesterolemia  atorvastatin (LIPITOR) 40 MG Tab    Evolocumab (REPATHA SURECLICK) 140 MG/ML Solution Auto-injector SubQ injection pen      6. Abdominal aortic aneurysm (AAA) without rupture, unspecified part (HCC)        7. Status post endovascular aneurysm repair (EVAR)            Medical Decision Making: Today's Assessment/Status/Plan:        1. CAD, status post remote PCI/stent to the RCA in 1999, doing well, no angina or shortness of breath. Continue:   ASA 81mg once daily   Losartan 50mg twice daily   Lipitor 40mg once daily   Repatha 140mg every 2 weeks SW    2.  Hypertension, treated with Losartan 50mg twice daily. BP is well controlled.     3. Hyperlipidemia, treated with statin/Repatha, with LDL at 43 (at goal). She is tolerating both well. To repeat labs in August 2025.     4. AAA, status post surgical repair in November 2024, which went well. BP is well controlled.     Same medications for now, which are renewed x 1 year.  Labs as above in August 2025.    Keep follow-up with other providers.  Follow-up with me in 1 year, sooner if clinical condition changes.                     [1]   Past Medical History:  Diagnosis Date    AAA (abdominal aortic aneurysm) without rupture (HCC) 11/2020    Abdominal US with AAA 3.9 x 3.9cm.     Abnormal LFTs     Neg hepatitis panel, possibly due to medication    CAD (coronary artery disease), native coronary artery 1999    PCI/stent (Duet 3.0 x 18mm) the RCA    Cataract     bilateral IOL    Celiac disease 2012    With protein losing enteropathy and transient apparent congestive failure provoked by that with normal echocardiogram. Evaluated by gastrointestinal consultants, Dr. Lira    Dental disorder     Dentures    Dyslipidemia     Elevated HDL and LDL    High cholesterol     Hypertension     Nummular eczema     Postsurgical menopause     At 27 yo due to bleeding    Sleep related leg cramps     Varicose vein of leg     right leg    Varicose veins of leg with complications    [2]   Past Surgical History:  Procedure Laterality Date    AAA WITH STENT GRAFT Bilateral 11/6/2024    Procedure: ENDOVASCULAR REPAIR OF ABDOMINAL AORTIC ANEURYSM;  Surgeon: Charlene Manuel M.D.;  Location: [facility];  Service: General    VEIN LIGATION RADIO FREQUENCY  7/19/2010    Performed by CHARLENE MANUEL at [facility] ORS    STENT PLACEMENT  1996    RCA done emergently    GERARD BY LAPAROSCOPY  1996    APPENDECTOMY      BREAST BIOPSY      left, normal    GYN SURGERY  over 30 years ago    hysterectomy    HYSTERECTOMY, TOTAL ABDOMINAL      with BSO did HRT for 25 yrs now off    TONSILLECTOMY AND ADENOIDECTOMY     [3]   Allergies  Allergen Reactions    Wheat Diarrhea     Celiac disease   [4]   Outpatient Encounter Medications as of 5/22/2025   Medication Sig Dispense Refill    vitamin D2, Ergocalciferol, (DRISDOL) 1.25 MG (15390 UT) Cap capsule Take  by mouth every day.      cyanocobalamin (VITAMIN B-12) 500 MCG Tab Take 500 mcg by mouth every day.      losartan (COZAAR) 50 MG Tab Take 1 Tablet by mouth 2 times a day. 200 Tablet 3    atorvastatin (LIPITOR) 40 MG Tab Take 1 Tablet by mouth at bedtime. 100 Tablet 3    Evolocumab (REPATHA SURECLICK) 140 MG/ML Solution Auto-injector SubQ injection pen Inject 1 mL  under the skin every 14 days. 6 Each 5    hydrALAZINE (APRESOLINE) 10 MG Tab Take 1 Tablet by mouth 3 times a day as needed (for systolic greater than 160). 90 Tablet 3    triamcinolone acetonide (KENALOG) 0.1 % Cream Apply 1 Application topically as needed (skin).      aspirin EC (ECOTRIN) 81 MG Tablet Delayed Response Take 81 mg by mouth every day. 90 tablet 3    vitamin e (VITAMIN E) 400 UNIT CAPS Take 400 Units by mouth every day.      [DISCONTINUED] Evolocumab (REPATHA SURECLICK) 140 MG/ML Solution Auto-injector SubQ injection pen Inject 1 mL under the skin every 14 days. 6 Each 3    [DISCONTINUED] atorvastatin (LIPITOR) 40 MG Tab Take 1 Tablet by mouth at bedtime. 100 Tablet 0    [DISCONTINUED] Menthol-Methyl Salicylate (SALONPAS PAIN RELIEF PATCH EX) Apply 1 Patch topically as needed (back pain). (Patient not taking: Reported on 5/22/2025)      [DISCONTINUED] losartan (COZAAR) 50 MG Tab Take 1 Tablet by mouth 2 times a day. 200 Tablet 3    [DISCONTINUED] clobetasol (TEMOVATE) 0.05 % external solution Apply to psoriasis to scalp twice per day x 2 weeks on then 2 weeks off (Patient not taking: Reported on 5/22/2025) 1 Bottle 3     No facility-administered encounter medications on file as of 5/22/2025.

## 2025-07-21 DIAGNOSIS — I10 ESSENTIAL HYPERTENSION, BENIGN: ICD-10-CM

## 2025-07-21 DIAGNOSIS — E78.5 DYSLIPIDEMIA: ICD-10-CM

## 2025-07-21 DIAGNOSIS — Z95.5 STENTED CORONARY ARTERY: ICD-10-CM

## 2025-07-21 DIAGNOSIS — I25.10 CORONARY ARTERY DISEASE INVOLVING NATIVE CORONARY ARTERY OF NATIVE HEART WITHOUT ANGINA PECTORIS: ICD-10-CM

## 2025-07-21 DIAGNOSIS — E78.00 PURE HYPERCHOLESTEROLEMIA: ICD-10-CM

## 2025-07-22 RX ORDER — ATORVASTATIN CALCIUM 40 MG/1
40 TABLET, FILM COATED ORAL
Qty: 100 TABLET | Refills: 3 | OUTPATIENT
Start: 2025-07-22

## 2025-07-22 NOTE — TELEPHONE ENCOUNTER
Is the patient due for a refill? No    Was the patient seen the last 15 months? Yes    Date of last office visit: 5/22/25    Does the patient have an upcoming appointment?  Yes   If yes, When? 5/28/26    Provider to refill:AB    Does the patient have USP Plus and need 100-day supply? (This applies to ALL medications) Patient does not have SCP

## 2025-07-31 DIAGNOSIS — Z98.890 S/P ENDOVASCULAR ANEURYSM REPAIR: ICD-10-CM

## 2025-07-31 DIAGNOSIS — I71.40 ABDOMINAL AORTIC ANEURYSM (AAA) WITHOUT RUPTURE, UNSPECIFIED PART (HCC): Primary | ICD-10-CM

## 2025-07-31 DIAGNOSIS — Z86.79 S/P ENDOVASCULAR ANEURYSM REPAIR: ICD-10-CM

## (undated) DEVICE — LACTATED RINGERS INJ 1000 ML - (14EA/CA 60CA/PF)

## (undated) DEVICE — SUTURE 5-0 PROLENE C-1 D/A 24 (36PK/BX)"

## (undated) DEVICE — GLOVE BIOGEL INDICATOR SZ 6.5 SURGICAL PF LTX - (50PR/BX 4BX/CA)

## (undated) DEVICE — TOWELS CLOTH SURGICAL - (4/PK 20PK/CA)

## (undated) DEVICE — GUIDEWIRES STARTER (PTFE COATED) J CURVED FIXED CORE 0.035 180CM 10 3 MM J FS

## (undated) DEVICE — GLIDECATH ANGLE 4F X 70CM (5EA/BX)

## (undated) DEVICE — GOWN SURGEONS X-LARGE - DISP. (30/CA)

## (undated) DEVICE — SET EXTENSION WITH 2 PORTS (48EA/CA) ***PART #2C8610 IS A SUBSTITUTE*****

## (undated) DEVICE — DRESSING TRANSPARENT FILM TEGADERM 2.375 X 2.75" (100EA/BX)"

## (undated) DEVICE — SENSOR OXIMETER ADULT SPO2 RD SET (20EA/BX)

## (undated) DEVICE — TUBE E-T HI-LO CUFF 6.5MM (10EA/BX)

## (undated) DEVICE — SHEATH RO 5F 10CM (10EA/BX)

## (undated) DEVICE — POLY UMBILICAL TAPE 1/8X30 - (36/BX)

## (undated) DEVICE — SUTURE 3-0 VICRYL PLUS SH - 27 INCH (36/BX)

## (undated) DEVICE — SLEEVE, VASO, THIGH, MED

## (undated) DEVICE — SUTURE CV

## (undated) DEVICE — GLOVE BIOGEL PI ORTHO SZ 6 SURGICAL PF LF (40PR/BX)

## (undated) DEVICE — CANISTER SUCTION 3000ML MECHANICAL FILTER AUTO SHUTOFF MEDI-VAC NONSTERILE LF DISP (40EA/CA)

## (undated) DEVICE — DRAPE IOBAN II 23 IN X 33 IN - (10/BX)

## (undated) DEVICE — DECANTER FLD BLS - (50/CA)

## (undated) DEVICE — TRAY SURESTEP FOLEY TEMP SENSING 16FR SNAP SECURE(10EA/CA) ORDER #18764 FOR TEMP FOLEY ONLY

## (undated) DEVICE — STAPLER 35MM SKIN WIDE ROTATING HEAD (6EA/BX)

## (undated) DEVICE — GLOVE BIOGEL SZ 6.5 SURGICAL PF LTX (50PR/BX 4BX/CA)

## (undated) DEVICE — SYRINGE STERILE 10 ML LL (200/BX)

## (undated) DEVICE — SHEATH INTRODUCER PERFORMER 12F X 30CM

## (undated) DEVICE — NEEDLE THINWALL 19GA X 7CM

## (undated) DEVICE — BALLOON RELIANT

## (undated) DEVICE — BLANKET UNDERBODY FULL ACCES - (5/CA)

## (undated) DEVICE — WIRE GUIDE LUNDERQUIST EXTRA STIFF DC

## (undated) DEVICE — DERMABOND ADVANCED - (12EA/BX)

## (undated) DEVICE — Device

## (undated) DEVICE — SODIUM CHL IRRIGATION 0.9% 1000ML (12EA/CA)

## (undated) DEVICE — SUTURE 6-0 PROLENE BV-1 D/A 24 (36PK/BX)"

## (undated) DEVICE — SET LEADWIRE 5 LEAD BEDSIDE DISPOSABLE ECG (1SET OF 5/EA)

## (undated) DEVICE — GLOVE BIOGEL INDICATOR SZ 7.5 SURGICAL PF LTX - (50PR/BX 4BX/CA)

## (undated) DEVICE — GLIDEWIRE ANGLED .035 X 180 (5EA/BX)

## (undated) DEVICE — PACK AV FISTULA (2EA/CA)

## (undated) DEVICE — SUCTION INSTRUMENT YANKAUER BULBOUS TIP W/O VENT (50EA/CA)

## (undated) DEVICE — VESSELOOP MAXI BLUE STERILE- SURG-I-LOOP (10EA/BX)

## (undated) DEVICE — SYRINGE 30 ML LL (56/BX)

## (undated) DEVICE — MEDICINE CUP STERILE 2 OZ - (100/CA)

## (undated) DEVICE — TUBING CLEARLINK DUO-VENT - C-FLO (48EA/CA)

## (undated) DEVICE — SYRINGE NON SAFETY 10 CC 21 GA X 1-1/2 IN (100/BX 4BX/CA)

## (undated) DEVICE — TRANSDUCER ADULT DISP. SINGLE BONDED STERILE - (10EA/CA)

## (undated) DEVICE — SUTURE GENERAL

## (undated) DEVICE — SPONGE GAUZESTER. 2X2 4-PL - (2/PK 50PK/BX 30BX/CS)

## (undated) DEVICE — ELECTRODE DUAL RETURN W/ CORD - (50/PK)

## (undated) DEVICE — GOWN WARMING STANDARD FLEX - (30/CA)

## (undated) DEVICE — SODIUM CHL. INJ. 0.9% 500ML (24EA/CA 50CA/PF)

## (undated) DEVICE — PACK ANGIOGRAPHY DRAPE - (2/CA)

## (undated) DEVICE — CHLORAPREP 26 ML APPLICATOR - ORANGE TINT(25/CA)

## (undated) DEVICE — SUTURE 4-0 MONOCRYL PLUS PS-2 - 27 INCH (36/BX)

## (undated) DEVICE — MARKER SIZING OMNIFLUSH 5F 70 - 5/BX .035 20CM SEGMENT

## (undated) DEVICE — GLOVE BIOGEL SZ 7 SURGICAL PF LTX - (50PR/BX 4BX/CA)

## (undated) DEVICE — MULTI TORQUE DEVICE DISP (5EA/BX)